# Patient Record
Sex: MALE | Race: BLACK OR AFRICAN AMERICAN | NOT HISPANIC OR LATINO | URBAN - METROPOLITAN AREA
[De-identification: names, ages, dates, MRNs, and addresses within clinical notes are randomized per-mention and may not be internally consistent; named-entity substitution may affect disease eponyms.]

---

## 2020-04-03 ENCOUNTER — INPATIENT (INPATIENT)
Facility: HOSPITAL | Age: 85
LOS: 10 days | Discharge: NURSING, REHAB OR LONG-TERM CARE | DRG: 975 | End: 2020-04-14
Attending: INTERNAL MEDICINE | Admitting: INTERNAL MEDICINE
Payer: MEDICAID

## 2020-04-03 VITALS
HEIGHT: 69 IN | HEART RATE: 79 BPM | RESPIRATION RATE: 28 BRPM | WEIGHT: 160.06 LBS | OXYGEN SATURATION: 93 % | SYSTOLIC BLOOD PRESSURE: 84 MMHG | DIASTOLIC BLOOD PRESSURE: 55 MMHG

## 2020-04-03 DIAGNOSIS — R09.02 HYPOXEMIA: ICD-10-CM

## 2020-04-03 DIAGNOSIS — E87.0 HYPEROSMOLALITY AND HYPERNATREMIA: ICD-10-CM

## 2020-04-03 DIAGNOSIS — E86.0 DEHYDRATION: ICD-10-CM

## 2020-04-03 DIAGNOSIS — Z29.9 ENCOUNTER FOR PROPHYLACTIC MEASURES, UNSPECIFIED: ICD-10-CM

## 2020-04-03 DIAGNOSIS — B20 HUMAN IMMUNODEFICIENCY VIRUS [HIV] DISEASE: ICD-10-CM

## 2020-04-03 DIAGNOSIS — I10 ESSENTIAL (PRIMARY) HYPERTENSION: ICD-10-CM

## 2020-04-03 DIAGNOSIS — J96.01 ACUTE RESPIRATORY FAILURE WITH HYPOXIA: ICD-10-CM

## 2020-04-03 DIAGNOSIS — N17.9 ACUTE KIDNEY FAILURE, UNSPECIFIED: ICD-10-CM

## 2020-04-03 LAB
ALBUMIN SERPL ELPH-MCNC: 3.4 G/DL — LOW (ref 3.5–5)
ALP SERPL-CCNC: 88 U/L — SIGNIFICANT CHANGE UP (ref 40–120)
ALT FLD-CCNC: 101 U/L DA — HIGH (ref 10–60)
ANION GAP SERPL CALC-SCNC: 18 MMOL/L — HIGH (ref 5–17)
AST SERPL-CCNC: 211 U/L — HIGH (ref 10–40)
BASOPHILS # BLD AUTO: 0.03 K/UL — SIGNIFICANT CHANGE UP (ref 0–0.2)
BASOPHILS NFR BLD AUTO: 0.2 % — SIGNIFICANT CHANGE UP (ref 0–2)
BILIRUB SERPL-MCNC: 2.2 MG/DL — HIGH (ref 0.2–1.2)
BUN SERPL-MCNC: 107 MG/DL — HIGH (ref 7–18)
CALCIUM SERPL-MCNC: 9.8 MG/DL — SIGNIFICANT CHANGE UP (ref 8.4–10.5)
CHLORIDE SERPL-SCNC: 116 MMOL/L — HIGH (ref 96–108)
CO2 SERPL-SCNC: 21 MMOL/L — LOW (ref 22–31)
CREAT SERPL-MCNC: 5.66 MG/DL — HIGH (ref 0.5–1.3)
D DIMER BLD IA.RAPID-MCNC: 1303 NG/ML DDU — HIGH
EOSINOPHIL # BLD AUTO: 0.04 K/UL — SIGNIFICANT CHANGE UP (ref 0–0.5)
EOSINOPHIL NFR BLD AUTO: 0.3 % — SIGNIFICANT CHANGE UP (ref 0–6)
ERYTHROCYTE [SEDIMENTATION RATE] IN BLOOD: 35 MM/HR — HIGH (ref 0–20)
GLUCOSE SERPL-MCNC: 148 MG/DL — HIGH (ref 70–99)
HCT VFR BLD CALC: 57.8 % — CRITICAL HIGH (ref 39–50)
HGB BLD-MCNC: 20.2 G/DL — CRITICAL HIGH (ref 13–17)
IMM GRANULOCYTES NFR BLD AUTO: 0.5 % — SIGNIFICANT CHANGE UP (ref 0–1.5)
LDH SERPL L TO P-CCNC: 657 U/L — HIGH (ref 120–225)
LYMPHOCYTES # BLD AUTO: 1.33 K/UL — SIGNIFICANT CHANGE UP (ref 1–3.3)
LYMPHOCYTES # BLD AUTO: 8.5 % — LOW (ref 13–44)
MCHC RBC-ENTMCNC: 29.7 PG — SIGNIFICANT CHANGE UP (ref 27–34)
MCHC RBC-ENTMCNC: 34.9 GM/DL — SIGNIFICANT CHANGE UP (ref 32–36)
MCV RBC AUTO: 85 FL — SIGNIFICANT CHANGE UP (ref 80–100)
MONOCYTES # BLD AUTO: 0.55 K/UL — SIGNIFICANT CHANGE UP (ref 0–0.9)
MONOCYTES NFR BLD AUTO: 3.5 % — SIGNIFICANT CHANGE UP (ref 2–14)
NEUTROPHILS # BLD AUTO: 13.58 K/UL — HIGH (ref 1.8–7.4)
NEUTROPHILS NFR BLD AUTO: 87 % — HIGH (ref 43–77)
NRBC # BLD: 0 /100 WBCS — SIGNIFICANT CHANGE UP (ref 0–0)
PLATELET # BLD AUTO: 144 K/UL — LOW (ref 150–400)
POTASSIUM SERPL-MCNC: 3.3 MMOL/L — LOW (ref 3.5–5.3)
POTASSIUM SERPL-SCNC: 3.3 MMOL/L — LOW (ref 3.5–5.3)
PROT SERPL-MCNC: 9.7 G/DL — HIGH (ref 6–8.3)
RBC # BLD: 6.8 M/UL — HIGH (ref 4.2–5.8)
RBC # FLD: 17.1 % — HIGH (ref 10.3–14.5)
SODIUM SERPL-SCNC: 155 MMOL/L — HIGH (ref 135–145)
WBC # BLD: 15.61 K/UL — HIGH (ref 3.8–10.5)
WBC # FLD AUTO: 15.61 K/UL — HIGH (ref 3.8–10.5)

## 2020-04-03 PROCEDURE — 71045 X-RAY EXAM CHEST 1 VIEW: CPT | Mod: 26

## 2020-04-03 PROCEDURE — 99284 EMERGENCY DEPT VISIT MOD MDM: CPT

## 2020-04-03 RX ORDER — BUPROPION HYDROCHLORIDE 150 MG/1
150 TABLET, EXTENDED RELEASE ORAL DAILY
Refills: 0 | Status: DISCONTINUED | OUTPATIENT
Start: 2020-04-03 | End: 2020-04-10

## 2020-04-03 RX ORDER — CEFTRIAXONE 500 MG/1
1000 INJECTION, POWDER, FOR SOLUTION INTRAMUSCULAR; INTRAVENOUS EVERY 24 HOURS
Refills: 0 | Status: COMPLETED | OUTPATIENT
Start: 2020-04-04 | End: 2020-04-08

## 2020-04-03 RX ORDER — CEFTRIAXONE 500 MG/1
INJECTION, POWDER, FOR SOLUTION INTRAMUSCULAR; INTRAVENOUS
Refills: 0 | Status: COMPLETED | OUTPATIENT
Start: 2020-04-03 | End: 2020-04-09

## 2020-04-03 RX ORDER — SODIUM CHLORIDE 9 MG/ML
1000 INJECTION INTRAMUSCULAR; INTRAVENOUS; SUBCUTANEOUS
Refills: 0 | Status: DISCONTINUED | OUTPATIENT
Start: 2020-04-03 | End: 2020-04-03

## 2020-04-03 RX ORDER — SODIUM CHLORIDE 9 MG/ML
1000 INJECTION INTRAMUSCULAR; INTRAVENOUS; SUBCUTANEOUS
Refills: 0 | Status: DISCONTINUED | OUTPATIENT
Start: 2020-04-03 | End: 2020-04-04

## 2020-04-03 RX ORDER — ASPIRIN/CALCIUM CARB/MAGNESIUM 324 MG
81 TABLET ORAL DAILY
Refills: 0 | Status: DISCONTINUED | OUTPATIENT
Start: 2020-04-03 | End: 2020-04-14

## 2020-04-03 RX ORDER — HEPARIN SODIUM 5000 [USP'U]/ML
5000 INJECTION INTRAVENOUS; SUBCUTANEOUS EVERY 12 HOURS
Refills: 0 | Status: DISCONTINUED | OUTPATIENT
Start: 2020-04-03 | End: 2020-04-14

## 2020-04-03 RX ORDER — ATORVASTATIN CALCIUM 80 MG/1
40 TABLET, FILM COATED ORAL AT BEDTIME
Refills: 0 | Status: DISCONTINUED | OUTPATIENT
Start: 2020-04-03 | End: 2020-04-10

## 2020-04-03 RX ORDER — SODIUM CHLORIDE 9 MG/ML
1000 INJECTION INTRAMUSCULAR; INTRAVENOUS; SUBCUTANEOUS ONCE
Refills: 0 | Status: COMPLETED | OUTPATIENT
Start: 2020-04-03 | End: 2020-04-03

## 2020-04-03 RX ORDER — AZITHROMYCIN 500 MG/1
500 TABLET, FILM COATED ORAL EVERY 24 HOURS
Refills: 0 | Status: DISCONTINUED | OUTPATIENT
Start: 2020-04-04 | End: 2020-04-10

## 2020-04-03 RX ORDER — ACETAMINOPHEN 500 MG
650 TABLET ORAL EVERY 6 HOURS
Refills: 0 | Status: DISCONTINUED | OUTPATIENT
Start: 2020-04-03 | End: 2020-04-10

## 2020-04-03 RX ORDER — AZITHROMYCIN 500 MG/1
TABLET, FILM COATED ORAL
Refills: 0 | Status: DISCONTINUED | OUTPATIENT
Start: 2020-04-03 | End: 2020-04-10

## 2020-04-03 RX ORDER — DOXAZOSIN MESYLATE 4 MG
2 TABLET ORAL AT BEDTIME
Refills: 0 | Status: DISCONTINUED | OUTPATIENT
Start: 2020-04-03 | End: 2020-04-10

## 2020-04-03 RX ORDER — AZITHROMYCIN 500 MG/1
500 TABLET, FILM COATED ORAL ONCE
Refills: 0 | Status: COMPLETED | OUTPATIENT
Start: 2020-04-03 | End: 2020-04-03

## 2020-04-03 RX ORDER — CLOPIDOGREL BISULFATE 75 MG/1
75 TABLET, FILM COATED ORAL DAILY
Refills: 0 | Status: DISCONTINUED | OUTPATIENT
Start: 2020-04-03 | End: 2020-04-14

## 2020-04-03 RX ORDER — CEFTRIAXONE 500 MG/1
1000 INJECTION, POWDER, FOR SOLUTION INTRAMUSCULAR; INTRAVENOUS ONCE
Refills: 0 | Status: COMPLETED | OUTPATIENT
Start: 2020-04-03 | End: 2020-04-03

## 2020-04-03 RX ADMIN — SODIUM CHLORIDE 1000 MILLILITER(S): 9 INJECTION INTRAMUSCULAR; INTRAVENOUS; SUBCUTANEOUS at 15:08

## 2020-04-03 RX ADMIN — SODIUM CHLORIDE 100 MILLILITER(S): 9 INJECTION INTRAMUSCULAR; INTRAVENOUS; SUBCUTANEOUS at 22:15

## 2020-04-03 RX ADMIN — CEFTRIAXONE 100 MILLIGRAM(S): 500 INJECTION, POWDER, FOR SOLUTION INTRAMUSCULAR; INTRAVENOUS at 20:03

## 2020-04-03 NOTE — ED PROVIDER NOTE - OBJECTIVE STATEMENT
86 y/o male with PMHx of HIV, CVA, HTN, HLD, dementia, and prostate CA sent from [place] for shortness of breath and hypoxia.  As per transfer papers, Sat 40% on room air.  In ED, Sat 93% with NRB at 15L.  Pt non-verbal and not able to give additional info.  History obtained from transfer papers and PMD - Dr. Nguyen.

## 2020-04-03 NOTE — ED PROVIDER NOTE - PROGRESS NOTE DETAILS
Emergency contact Myah Richmond contacted.  DNR/DNI state discussed however Ms. Richmond would like to discussed with family before making any decisions.   Creat of 5 noted, normal Potassium  pt to be admitted.

## 2020-04-03 NOTE — ED PROVIDER NOTE - CLINICAL SUMMARY MEDICAL DECISION MAKING FREE TEXT BOX
86 y/o male with history of dementia, CVA, HIV, prostate CA sent from nursing home for shortness of breath and hypoxia to ?40%.  Sat 93% on NRB.  likely COVID,  will check labs, CXR, COVID test and admit as patient is in need for supplemental oxygen.

## 2020-04-03 NOTE — H&P ADULT - PROBLEM SELECTOR PLAN 1
p/w Shortness of breath, cough   - Respiratory status- Not in distress, S02 on NC on my evaluation  - T-   - WBC: WNL, lymphopenia, transaminitis  - Flu: neg   - CXR --   - Ed course; Rocephin and Azithro, IV NS  lt.   - Will c/w Rocephin and Azithro to cover for CAP.   - Tylenol, Albuterol Inhaler, Robitussin PRN  - plaquenil, Vit. C, thiamine  - Will rule out covid 19 with contact and airborne isolation precaution   - LDH, Procalcitonin, ferritin   - FU COVID, RVP p/w hypoxia   - Respiratory status- Not in distress, S02 94% on NRB on my evaluation  - T- afebrile  - WBC: 15.61, transaminitis  - CXR -- No evidence of active chest disease. (likely due to dehydration)  - Ed course; IV NS 1 lt.   - Will c/w Rocephin and Azithro to cover for CAP.   - Tylenol, Robitussin PRN  - Vit. C, thiamine  - Will rule out covid 19 with contact and airborne isolation precaution   - LDH, Procalcitonin, ferritin   - FU COVID

## 2020-04-03 NOTE — H&P ADULT - NSHPPHYSICALEXAM_GEN_ALL_CORE
Vital Signs Last 24 Hrs  T(C): 36.4 (03 Apr 2020 15:19), Max: 36.6 (03 Apr 2020 13:46)  T(F): 97.5 (03 Apr 2020 15:19), Max: 97.9 (03 Apr 2020 13:46)  HR: 98 (03 Apr 2020 15:19) (79 - 110)  BP: 122/75 (03 Apr 2020 15:19) (84/55 - 122/75)  BP(mean): --  RR: 24 (03 Apr 2020 15:19) (24 - 28)  SpO2: 94% (03 Apr 2020 15:19) (93% - 94%)    PHYSICAL EXAM:  GENERAL: NAD, dry  HEENT: Normocephalic;  conjunctivae and sclerae clear;  NECK : supple  CHEST/LUNG: Clear to auscultation bilaterally with good air entry   HEART: S1 S2  regular; no murmurs, gallops or rubs  ABDOMEN: Soft, Nontender, Nondistended; Bowel sounds present  EXTREMITIES: no cyanosis; no edema; no calf tenderness  SKIN: warm and dry; no rash  NERVOUS SYSTEM:  Awake and alert; AAO XO ; no new deficits

## 2020-04-03 NOTE — CHART NOTE - NSCHARTNOTEFT_GEN_A_CORE
Notified by nurse that pt. has bleeding from nichols. Attempted to call surgery multiple times, unable to reach. Will keep nichols for now to prevent further injury and attempt to call surgery again.

## 2020-04-03 NOTE — H&P ADULT - NSICDXPASTMEDICALHX_GEN_ALL_CORE_FT
PAST MEDICAL HISTORY:  Cerebrovascular accident (CVA)     Dementia     History of TIAs     HIV disease     HTN (hypertension)     Prostate CA

## 2020-04-03 NOTE — ED ADULT NURSE NOTE - NSIMPLEMENTINTERV_GEN_ALL_ED
Implemented All Fall with Harm Risk Interventions:  [place] to call system. Call bell, personal items and telephone within reach. Instruct patient to call for assistance. Room bathroom lighting operational. Non-slip footwear when patient is off stretcher. Physically safe environment: no spills, clutter or unnecessary equipment. Stretcher in lowest position, wheels locked, appropriate side rails in place. Provide visual cue, wrist band, yellow gown, etc. Monitor gait and stability. Monitor for mental status changes and reorient to person, place, and time. Review medications for side effects contributing to fall risk. Reinforce activity limits and safety measures with patient and family. Provide visual clues: red socks.

## 2020-04-03 NOTE — H&P ADULT - HISTORY OF PRESENT ILLNESS
85M with PMHx of HIV, CVA, HTN, HLD, dementia, and prostate CA sent from [place] for shortness of breath and hypoxia. As per transfer papers, Sat 40% on room air.  In ED, Sat 93% with NRB at 15L. Pt non-verbal and not able to give additional info.  History obtained from transfer papers and PMD - Dr. Nguyen.

## 2020-04-03 NOTE — H&P ADULT - ASSESSMENT
85M with PMHx of HIV, CVA, HTN, HLD, dementia, and prostate CA sent from [place] for shortness of breath and hypoxia. As per transfer papers, Sat 40% on room air.  In ED, Sat 93% with NRB at 15L. Pt non-verbal and not able to give additional info.  History obtained from transfer papers and PMD - Dr. Nguyen. Unable to reach emergency .

## 2020-04-04 LAB
ALBUMIN SERPL ELPH-MCNC: 3.1 G/DL — LOW (ref 3.5–5)
ALBUMIN SERPL ELPH-MCNC: SIGNIFICANT CHANGE UP G/DL (ref 3.5–5)
ALP SERPL-CCNC: 84 U/L — SIGNIFICANT CHANGE UP (ref 40–120)
ALP SERPL-CCNC: 88 U/L — SIGNIFICANT CHANGE UP (ref 40–120)
ALT FLD-CCNC: 95 U/L DA — HIGH (ref 10–60)
ALT FLD-CCNC: 97 U/L DA — HIGH (ref 10–60)
ANION GAP SERPL CALC-SCNC: 10 MMOL/L — SIGNIFICANT CHANGE UP (ref 5–17)
ANION GAP SERPL CALC-SCNC: SIGNIFICANT CHANGE UP MMOL/L (ref 5–17)
AST SERPL-CCNC: 196 U/L — HIGH (ref 10–40)
AST SERPL-CCNC: SIGNIFICANT CHANGE UP U/L (ref 10–40)
BASOPHILS # BLD AUTO: 0.01 K/UL — SIGNIFICANT CHANGE UP (ref 0–0.2)
BASOPHILS NFR BLD AUTO: 0.1 % — SIGNIFICANT CHANGE UP (ref 0–2)
BILIRUB SERPL-MCNC: 2 MG/DL — HIGH (ref 0.2–1.2)
BILIRUB SERPL-MCNC: 2.3 MG/DL — HIGH (ref 0.2–1.2)
BUN SERPL-MCNC: 108 MG/DL — HIGH (ref 7–18)
BUN SERPL-MCNC: SIGNIFICANT CHANGE UP MG/DL (ref 7–18)
CALCIUM SERPL-MCNC: 9.3 MG/DL — SIGNIFICANT CHANGE UP (ref 8.4–10.5)
CALCIUM SERPL-MCNC: SIGNIFICANT CHANGE UP MG/DL (ref 8.4–10.5)
CHLORIDE SERPL-SCNC: 118 MMOL/L — HIGH (ref 96–108)
CHLORIDE SERPL-SCNC: SIGNIFICANT CHANGE UP MMOL/L (ref 96–108)
CHOLEST SERPL-MCNC: 132 MG/DL — SIGNIFICANT CHANGE UP (ref 10–199)
CO2 SERPL-SCNC: 24 MMOL/L — SIGNIFICANT CHANGE UP (ref 22–31)
CO2 SERPL-SCNC: SIGNIFICANT CHANGE UP MMOL/L (ref 22–31)
CREAT SERPL-MCNC: 4.52 MG/DL — HIGH (ref 0.5–1.3)
CREAT SERPL-MCNC: 5.27 MG/DL — HIGH (ref 0.5–1.3)
EOSINOPHIL # BLD AUTO: 0 K/UL — SIGNIFICANT CHANGE UP (ref 0–0.5)
EOSINOPHIL NFR BLD AUTO: 0 % — SIGNIFICANT CHANGE UP (ref 0–6)
FERRITIN SERPL-MCNC: 1441 NG/ML — HIGH (ref 30–400)
GLUCOSE BLDC GLUCOMTR-MCNC: 148 MG/DL — HIGH (ref 70–99)
GLUCOSE BLDC GLUCOMTR-MCNC: 61 MG/DL — LOW (ref 70–99)
GLUCOSE SERPL-MCNC: 103 MG/DL — HIGH (ref 70–99)
GLUCOSE SERPL-MCNC: SIGNIFICANT CHANGE UP MG/DL (ref 70–99)
HBA1C BLD-MCNC: 6.2 % — HIGH (ref 4–5.6)
HCT VFR BLD CALC: 56 % — HIGH (ref 39–50)
HDLC SERPL-MCNC: 43 MG/DL — SIGNIFICANT CHANGE UP
HGB BLD-MCNC: 19.3 G/DL — CRITICAL HIGH (ref 13–17)
IMM GRANULOCYTES NFR BLD AUTO: 0.5 % — SIGNIFICANT CHANGE UP (ref 0–1.5)
LDH SERPL L TO P-CCNC: 666 U/L — HIGH (ref 120–225)
LIPID PNL WITH DIRECT LDL SERPL: 61 MG/DL — SIGNIFICANT CHANGE UP
LYMPHOCYTES # BLD AUTO: 1.46 K/UL — SIGNIFICANT CHANGE UP (ref 1–3.3)
LYMPHOCYTES # BLD AUTO: 11 % — LOW (ref 13–44)
MAGNESIUM SERPL-MCNC: 3.2 MG/DL — HIGH (ref 1.6–2.6)
MCHC RBC-ENTMCNC: 29.1 PG — SIGNIFICANT CHANGE UP (ref 27–34)
MCHC RBC-ENTMCNC: 34.5 GM/DL — SIGNIFICANT CHANGE UP (ref 32–36)
MCV RBC AUTO: 84.3 FL — SIGNIFICANT CHANGE UP (ref 80–100)
MONOCYTES # BLD AUTO: 0.4 K/UL — SIGNIFICANT CHANGE UP (ref 0–0.9)
MONOCYTES NFR BLD AUTO: 3 % — SIGNIFICANT CHANGE UP (ref 2–14)
NEUTROPHILS # BLD AUTO: 11.36 K/UL — HIGH (ref 1.8–7.4)
NEUTROPHILS NFR BLD AUTO: 85.4 % — HIGH (ref 43–77)
NRBC # BLD: 0 /100 WBCS — SIGNIFICANT CHANGE UP (ref 0–0)
PHOSPHATE SERPL-MCNC: 4.3 MG/DL — SIGNIFICANT CHANGE UP (ref 2.5–4.5)
PLATELET # BLD AUTO: 115 K/UL — LOW (ref 150–400)
POTASSIUM SERPL-MCNC: 3.4 MMOL/L — LOW (ref 3.5–5.3)
POTASSIUM SERPL-MCNC: SIGNIFICANT CHANGE UP MMOL/L (ref 3.5–5.3)
POTASSIUM SERPL-SCNC: 3.4 MMOL/L — LOW (ref 3.5–5.3)
POTASSIUM SERPL-SCNC: SIGNIFICANT CHANGE UP MMOL/L (ref 3.5–5.3)
PROT SERPL-MCNC: 8.6 G/DL — HIGH (ref 6–8.3)
PROT SERPL-MCNC: 8.8 G/DL — HIGH (ref 6–8.3)
RBC # BLD: 6.64 M/UL — HIGH (ref 4.2–5.8)
RBC # FLD: 16.5 % — HIGH (ref 10.3–14.5)
SARS-COV-2 RNA SPEC QL NAA+PROBE: DETECTED
SODIUM SERPL-SCNC: 152 MMOL/L — HIGH (ref 135–145)
SODIUM SERPL-SCNC: SIGNIFICANT CHANGE UP MMOL/L (ref 135–145)
TOTAL CHOLESTEROL/HDL RATIO MEASUREMENT: 3.1 RATIO — LOW (ref 3.4–9.6)
TRIGL SERPL-MCNC: 139 MG/DL — SIGNIFICANT CHANGE UP (ref 10–149)
TSH SERPL-MCNC: 0.54 UU/ML — SIGNIFICANT CHANGE UP (ref 0.34–4.82)
VIT B12 SERPL-MCNC: >2000 PG/ML — HIGH (ref 232–1245)
WBC # BLD: 13.3 K/UL — HIGH (ref 3.8–10.5)
WBC # FLD AUTO: 13.3 K/UL — HIGH (ref 3.8–10.5)

## 2020-04-04 RX ORDER — DEXTROSE 50 % IN WATER 50 %
25 SYRINGE (ML) INTRAVENOUS ONCE
Refills: 0 | Status: DISCONTINUED | OUTPATIENT
Start: 2020-04-04 | End: 2020-04-04

## 2020-04-04 RX ORDER — DOLUTEGRAVIR SODIUM 25 MG/1
50 TABLET, FILM COATED ORAL DAILY
Refills: 0 | Status: DISCONTINUED | OUTPATIENT
Start: 2020-04-04 | End: 2020-04-14

## 2020-04-04 RX ORDER — DEXTROSE 50 % IN WATER 50 %
50 SYRINGE (ML) INTRAVENOUS ONCE
Refills: 0 | Status: COMPLETED | OUTPATIENT
Start: 2020-04-04 | End: 2020-04-04

## 2020-04-04 RX ORDER — SODIUM CHLORIDE 9 MG/ML
1000 INJECTION, SOLUTION INTRAVENOUS
Refills: 0 | Status: DISCONTINUED | OUTPATIENT
Start: 2020-04-04 | End: 2020-04-05

## 2020-04-04 RX ADMIN — CEFTRIAXONE 100 MILLIGRAM(S): 500 INJECTION, POWDER, FOR SOLUTION INTRAMUSCULAR; INTRAVENOUS at 17:44

## 2020-04-04 RX ADMIN — Medication 50 MILLILITER(S): at 01:15

## 2020-04-04 RX ADMIN — Medication 2 MILLIGRAM(S): at 21:55

## 2020-04-04 RX ADMIN — SODIUM CHLORIDE 100 MILLILITER(S): 9 INJECTION, SOLUTION INTRAVENOUS at 08:00

## 2020-04-04 RX ADMIN — HEPARIN SODIUM 5000 UNIT(S): 5000 INJECTION INTRAVENOUS; SUBCUTANEOUS at 06:21

## 2020-04-04 RX ADMIN — AZITHROMYCIN 255 MILLIGRAM(S): 500 TABLET, FILM COATED ORAL at 17:44

## 2020-04-04 RX ADMIN — SODIUM CHLORIDE 100 MILLILITER(S): 9 INJECTION, SOLUTION INTRAVENOUS at 01:28

## 2020-04-04 RX ADMIN — HEPARIN SODIUM 5000 UNIT(S): 5000 INJECTION INTRAVENOUS; SUBCUTANEOUS at 19:28

## 2020-04-04 RX ADMIN — ATORVASTATIN CALCIUM 40 MILLIGRAM(S): 80 TABLET, FILM COATED ORAL at 21:55

## 2020-04-04 NOTE — PROGRESS NOTE ADULT - SUBJECTIVE AND OBJECTIVE BOX
Patient is a 85y old  Male who presents with a chief complaint of   PATIENT IS SEEN AND EXAMINED IN MEDICAL FLOOR.  NGT [    ]    VANESSA [   ]      GT [   ]    ALLERGIES:  No Known Allergies      Daily     Daily     VITALS:    Vital Signs Last 24 Hrs  T(C): 36.9 (04 Apr 2020 17:05), Max: 37.2 (04 Apr 2020 06:38)  T(F): 98.5 (04 Apr 2020 17:05), Max: 99 (04 Apr 2020 06:38)  HR: 109 (04 Apr 2020 17:05) (69 - 109)  BP: 82/66 (04 Apr 2020 17:05) (82/66 - 132/85)  BP(mean): --  RR: 19 (04 Apr 2020 17:05) (18 - 24)  SpO2: 99% (04 Apr 2020 17:05) (51% - 99%)    LABS:    CBC Full  -  ( 04 Apr 2020 08:59 )  WBC Count : 13.30 K/uL  RBC Count : 6.64 M/uL  Hemoglobin : 19.3 g/dL  Hematocrit : 56.0 %  Platelet Count - Automated : 115 K/uL  Mean Cell Volume : 84.3 fl  Mean Cell Hemoglobin : 29.1 pg  Mean Cell Hemoglobin Concentration : 34.5 gm/dL  Auto Neutrophil # : 11.36 K/uL  Auto Lymphocyte # : 1.46 K/uL  Auto Monocyte # : 0.40 K/uL  Auto Eosinophil # : 0.00 K/uL  Auto Basophil # : 0.01 K/uL  Auto Neutrophil % : 85.4 %  Auto Lymphocyte % : 11.0 %  Auto Monocyte % : 3.0 %  Auto Eosinophil % : 0.0 %  Auto Basophil % : 0.1 %      04-04    152<H>  |  118<H>  |  108<H>  ----------------------------<  103<H>  3.4<L>   |  24  |  4.52<H>    Ca    9.3      04 Apr 2020 08:59  Phos  4.3     04-04  Mg     3.2     04-04    TPro  8.6<H>  /  Alb  3.1<L>  /  TBili  2.0<H>  /  DBili  x   /  AST  196<H>  /  ALT  97<H>  /  AlkPhos  88  04-04    CAPILLARY BLOOD GLUCOSE      POCT Blood Glucose.: 148 mg/dL (04 Apr 2020 06:13)  POCT Blood Glucose.: 61 mg/dL (04 Apr 2020 01:09)        LIVER FUNCTIONS - ( 04 Apr 2020 08:59 )  Alb: 3.1 g/dL / Pro: 8.6 g/dL / ALK PHOS: 88 U/L / ALT: 97 U/L DA / AST: 196 U/L / GGT: x           Creatinine Trend: 4.52<--, see note<--, 5.66<--  I&O's Summary    03 Apr 2020 07:01  -  04 Apr 2020 07:00  --------------------------------------------------------  IN: 0 mL / OUT: 2 mL / NET: -2 mL                MEDICATIONS:    MEDICATIONS  (STANDING):  abacavir 600 mG/lamivudine 300 mG 1 Tablet(s) Oral daily  aspirin  chewable 81 milliGRAM(s) Oral daily  atorvastatin 40 milliGRAM(s) Oral at bedtime  azithromycin  IVPB      azithromycin  IVPB 500 milliGRAM(s) IV Intermittent every 24 hours  buPROPion XL . 150 milliGRAM(s) Oral daily  cefTRIAXone   IVPB 1000 milliGRAM(s) IV Intermittent every 24 hours  cefTRIAXone   IVPB      clopidogrel Tablet 75 milliGRAM(s) Oral daily  dextrose 5%. 1000 milliLiter(s) (100 mL/Hr) IV Continuous <Continuous>  dolutegravir 50 milliGRAM(s) Oral daily  doxazosin 2 milliGRAM(s) Oral at bedtime  heparin  Injectable 5000 Unit(s) SubCutaneous every 12 hours      MEDICATIONS  (PRN):  acetaminophen  Suppository .. 650 milliGRAM(s) Rectal every 6 hours PRN Temp greater or equal to 38C (100.4F)  guaiFENesin   Syrup  (Sugar-Free) 100 milliGRAM(s) Oral every 6 hours PRN Cough      REVIEW OF SYSTEMS:                           ALL ROS DONE [ X   ]    CONSTITUTIONAL:  LETHARGIC [   ], FEVER [   ], UNRESPONSIVE [   ]  CVS:  CP  [   ], SOB, [   ], PALPITATIONS [   ], DIZZYNESS [   ]  RS: COUGH [   ], SPUTUM [   ]  GI: ABDOMINAL PAIN [   ], NAUSEA [   ], VOMITINGS [   ], DIARRHEA [   ], CONSTIPATION [   ]  :  DYSURIA [   ], NOCTURIA [   ], INCREASED FREQUENCY [   ], DRIBLING [   ],  SKELETAL: PAINFUL JOINTS [   ], SWOLLEN JOINTS [   ], NECK ACHE [   ], LOW BACK ACHE [   ],  SKIN : ULCERS [   ], RASH [   ], ITCHING [   ]  CNS: HEAD ACHE [   ], DOUBLE VISION [   ], BLURRED VISION [   ], AMS / CONFUSION [   ], SEIZURES [   ], WEAKNESS [   ],TINGLING / NUMBNESS [   ]    PHYSICAL EXAMINATION:  GENERAL APPEARANCE: NO DISTRESS  HEENT:  NO PALLOR, NO  JVD,  NO   NODES, NECK SUPPLE  CVS: S1 +, S2 +,   RS: AEEB,  OCCASIONAL  RALES +,   NO RONCHI  ABD: SOFT, NT, NO, BS +  EXT: NO PE  SKIN: WARM,   SKELETAL:  ROM ACCEPTABLE  CNS:  AAO X  0  ,   UNRESPONSIVE    RADIOLOGY :    < from: Xray Chest 1 View-PORTABLE IMMEDIATE (04.03.20 @ 15:11) >    FINDINGS:   The lungs  are clear.  No pleural abnormality is seen.    Heart size within normal limits allowing for magnification effect of AP projection. Aortic arch calcified.  Visualized osseous structures are intact.        IMPRESSION:   No evidence of active chest disease.    < end of copied text >      COVID-19 PCR . (04.03.20 @ 23:59)    COVID-19 PCR: Detected: This test has been validated by [employer] to be accurate;  though it has not been FDA cleared/approved by the usual pathway.  As with all laboratory tests, results should be correlated with clinical  findings.  https://www.fda.gov/media/253469/download  https://www.fda.gov/media/216421/download        ASSESSMENT :     Hypoxemia  Yes  Dementia  History of TIAs  Prostate CA  HTN (hypertension)  Cerebrovascular accident (CVA)  HIV disease      PLAN:    HPI:  85M with PMHx of HIV, CVA, HTN, HLD, dementia, and prostate CA sent from [place] for shortness of breath and hypoxia. As per transfer papers, Sat 40% on room air.  In ED, Sat 93% with NRB at 15L. Pt non-verbal and not able to give additional info.  History obtained from transfer papers and PMD - Dr. Nguyen. (03 Apr 2020 15:32)    - HYPOXIC RESPIRATORY FAILURE DUE TO COVID 19 PNEUMONIA , AND SEPSIS  - ARF, CKD , HYPERNATREMIA   - AMS DUE TO METABOLIC ENCEPHALOPATHY  - PROGNOSIS IS POOR. PALLIATIVE CARE IS IN PROGRESS, D/W FAMILY - DNR / DNI / COMFORT CARE   - GI AND DVT PROPHYLAXIS  - DR. NGUYEN

## 2020-04-04 NOTE — RAPID RESPONSE TEAM SUMMARY - NSSITUATIONBACKGROUNDRRT_GEN_ALL_CORE
RRT called for 85 M w/ PMH HIV, CVA, HTN, HLD, Dementia, and Prostate CA sent from [place] for shortness of breath and hypoxia placed on NRB - discussed patient's severity of illness w/ jammie Richmond, agreed for comfort measures, DNR, DNI, and she will address the patient's course with here sister (them 2 being the last reported living relatives)

## 2020-04-05 RX ORDER — SODIUM CHLORIDE 9 MG/ML
1000 INJECTION, SOLUTION INTRAVENOUS
Refills: 0 | Status: DISCONTINUED | OUTPATIENT
Start: 2020-04-05 | End: 2020-04-06

## 2020-04-05 RX ADMIN — AZITHROMYCIN 255 MILLIGRAM(S): 500 TABLET, FILM COATED ORAL at 17:22

## 2020-04-05 RX ADMIN — CEFTRIAXONE 100 MILLIGRAM(S): 500 INJECTION, POWDER, FOR SOLUTION INTRAMUSCULAR; INTRAVENOUS at 17:22

## 2020-04-05 RX ADMIN — SODIUM CHLORIDE 100 MILLILITER(S): 9 INJECTION, SOLUTION INTRAVENOUS at 17:16

## 2020-04-05 RX ADMIN — HEPARIN SODIUM 5000 UNIT(S): 5000 INJECTION INTRAVENOUS; SUBCUTANEOUS at 17:15

## 2020-04-05 RX ADMIN — Medication 2 MILLIGRAM(S): at 21:41

## 2020-04-05 RX ADMIN — ATORVASTATIN CALCIUM 40 MILLIGRAM(S): 80 TABLET, FILM COATED ORAL at 21:41

## 2020-04-05 RX ADMIN — HEPARIN SODIUM 5000 UNIT(S): 5000 INJECTION INTRAVENOUS; SUBCUTANEOUS at 06:16

## 2020-04-05 NOTE — PROGRESS NOTE ADULT - SUBJECTIVE AND OBJECTIVE BOX
Patient is a 85y old  Male who presents with a chief complaint of Acute Hypoxemia due to Viral Pneumonia (05 Apr 2020 16:15)    PATIENT IS SEEN AND EXAMINED IN MEDICAL FLOOR.    ALLERGIES:  No Known Allergies        VITALS:    Vital Signs Last 24 Hrs  T(C): 36.6 (05 Apr 2020 16:38), Max: 36.9 (05 Apr 2020 01:16)  T(F): 97.9 (05 Apr 2020 16:38), Max: 98.4 (05 Apr 2020 01:16)  HR: 89 (05 Apr 2020 16:38) (73 - 112)  BP: 120/86 (05 Apr 2020 16:38) (111/80 - 132/59)  BP(mean): --  RR: 22 (05 Apr 2020 16:38) (22 - 22)  SpO2: 97% (05 Apr 2020 16:38) (95% - 98%)    LABS:    CBC Full  -  ( 04 Apr 2020 08:59 )  WBC Count : 13.30 K/uL  RBC Count : 6.64 M/uL  Hemoglobin : 19.3 g/dL  Hematocrit : 56.0 %  Platelet Count - Automated : 115 K/uL  Mean Cell Volume : 84.3 fl  Mean Cell Hemoglobin : 29.1 pg  Mean Cell Hemoglobin Concentration : 34.5 gm/dL  Auto Neutrophil # : 11.36 K/uL  Auto Lymphocyte # : 1.46 K/uL  Auto Monocyte # : 0.40 K/uL  Auto Eosinophil # : 0.00 K/uL  Auto Basophil # : 0.01 K/uL  Auto Neutrophil % : 85.4 %  Auto Lymphocyte % : 11.0 %  Auto Monocyte % : 3.0 %  Auto Eosinophil % : 0.0 %  Auto Basophil % : 0.1 %      04-04    152<H>  |  118<H>  |  108<H>  ----------------------------<  103<H>  3.4<L>   |  24  |  4.52<H>    Ca    9.3      04 Apr 2020 08:59  Phos  4.3     04-04  Mg     3.2     04-04    TPro  8.6<H>  /  Alb  3.1<L>  /  TBili  2.0<H>  /  DBili  x   /  AST  196<H>  /  ALT  97<H>  /  AlkPhos  88  04-04      LIVER FUNCTIONS - ( 04 Apr 2020 08:59 )  Alb: 3.1 g/dL / Pro: 8.6 g/dL / ALK PHOS: 88 U/L / ALT: 97 U/L DA / AST: 196 U/L / GGT: x           Creatinine Trend: 4.52<--, see note<--, 5.66<--  I&O's Summary      MEDICATIONS:    MEDICATIONS  (STANDING):  abacavir 600 mG/lamivudine 300 mG 1 Tablet(s) Oral daily  aspirin  chewable 81 milliGRAM(s) Oral daily  atorvastatin 40 milliGRAM(s) Oral at bedtime  azithromycin  IVPB      azithromycin  IVPB 500 milliGRAM(s) IV Intermittent every 24 hours  buPROPion XL . 150 milliGRAM(s) Oral daily  cefTRIAXone   IVPB 1000 milliGRAM(s) IV Intermittent every 24 hours  cefTRIAXone   IVPB      clopidogrel Tablet 75 milliGRAM(s) Oral daily  dextrose 5%. 1000 milliLiter(s) (100 mL/Hr) IV Continuous <Continuous>  dolutegravir 50 milliGRAM(s) Oral daily  doxazosin 2 milliGRAM(s) Oral at bedtime  heparin  Injectable 5000 Unit(s) SubCutaneous every 12 hours      MEDICATIONS  (PRN):  acetaminophen  Suppository .. 650 milliGRAM(s) Rectal every 6 hours PRN Temp greater or equal to 38C (100.4F)  guaiFENesin   Syrup  (Sugar-Free) 100 milliGRAM(s) Oral every 6 hours PRN Cough      REVIEW OF SYSTEMS:                           ALL ROS DONE [ X   ]    CONSTITUTIONAL:  LETHARGIC [   ], FEVER [   ], UNRESPONSIVE [   ]  CVS:  CP  [   ], SOB, [   ], PALPITATIONS [   ], DIZZYNESS [   ]  RS: COUGH [   ], SPUTUM [   ]  GI: ABDOMINAL PAIN [   ], NAUSEA [   ], VOMITINGS [   ], DIARRHEA [   ], CONSTIPATION [   ]  :  DYSURIA [   ], NOCTURIA [   ], INCREASED FREQUENCY [   ], DRIBLING [   ],  SKELETAL: PAINFUL JOINTS [   ], SWOLLEN JOINTS [   ], NECK ACHE [   ], LOW BACK ACHE [   ],  SKIN : ULCERS [   ], RASH [   ], ITCHING [   ]  CNS: HEAD ACHE [   ], DOUBLE VISION [   ], BLURRED VISION [   ], AMS / CONFUSION [   ], SEIZURES [   ], WEAKNESS [   ],TINGLING / NUMBNESS [   ]    PHYSICAL EXAMINATION:  GENERAL APPEARANCE: NO DISTRESS  HEENT:  NO PALLOR, NO  JVD,  NO   NODES, NECK SUPPLE  CVS: S1 +, S2 +,   RS: AEEB,  OCCASIONAL  RALES +,   NO RONCHI  ABD: SOFT, NT, NO, BS +  EXT: NO PE  SKIN: WARM,   SKELETAL:  ROM ACCEPTABLE  CNS:  AAO X  0  ,   UNRESPONSIVE    RADIOLOGY :    < from: Xray Chest 1 View-PORTABLE IMMEDIATE (04.03.20 @ 15:11) >    FINDINGS:   The lungs  are clear.  No pleural abnormality is seen.    Heart size within normal limits allowing for magnification effect of AP projection. Aortic arch calcified.  Visualized osseous structures are intact.        IMPRESSION:   No evidence of active chest disease.    < end of copied text >      COVID-19 PCR . (04.03.20 @ 23:59)    COVID-19 PCR: Detected: This test has been validated by [employer] to be accurate;  though it has not been FDA cleared/approved by the usual pathway.  As with all laboratory tests, results should be correlated with clinical  findings.  https://www.fda.gov/media/178397/download  https://www.fda.gov/media/295194/download        ASSESSMENT :     Hypoxemia  Yes  Dementia  History of TIAs  Prostate CA  HTN (hypertension)  Cerebrovascular accident (CVA)  HIV disease      PLAN:    HPI:  85M with PMHx of HIV, CVA, HTN, HLD, dementia, and prostate CA sent from [place] for shortness of breath and hypoxia. As per transfer papers, Sat 40% on room air.  In ED, Sat 93% with NRB at 15L. Pt non-verbal and not able to give additional info.  History obtained from transfer papers and PMD - Dr. Nguyen. (03 Apr 2020 15:32)    - HYPOXIC RESPIRATORY FAILURE DUE TO COVID 19 PNEUMONIA , AND SEPSIS  - ARF, CKD , HYPERNATREMIA   - AMS DUE TO METABOLIC ENCEPHALOPATHY, ADVANCED VASCULAR DEMENTIA   -  HIV - STABLE ON ANTIRETROVIRALS  - PROGNOSIS IS POOR. PALLIATIVE CARE IS IN PROGRESS, D/W FAMILY - DNR / DNI / COMFORT CARE   - GI AND DVT PROPHYLAXIS  - DR. NGUYEN

## 2020-04-05 NOTE — PROGRESS NOTE ADULT - ASSESSMENT
85M with PMHx of HIV, CVA, HTN, HLD, dementia, and prostate CA sent from [place] for shortness of breath and hypoxia. As per transfer papers, Sat 40% on room air.  In ED, Sat 93% with NRB at 15L. Pt non-verbal and not able to give additional info.  History obtained from transfer papers and PMD - Dr. Nguyen. Unable to reach emergency .

## 2020-04-05 NOTE — PROGRESS NOTE ADULT - PROBLEM SELECTOR PLAN 1
p/w hypoxia   - Respiratory status- Not in distress, S02 95% on Nasal Cannula @ 5L  - T- afebrile  - WBC: 13.30  - CXR -- No evidence of active chest disease.   - will d/w attending regarding discontinue Rocephin  and starting Plaquenil and Azithromycin regimen for Covid+ PNA  - Tylenol, Robitussin PRN  - Vit. C, thiamine  - Positive covid 19 with contact and airborne isolation precaution p/w hypoxia   - Respiratory status- Not in distress, S02 95% on NRB @ 15 Liters (patient did not tolerate Nasal Canula, desat 02 to 80s)  - T- afebrile  - WBC: 13.30  - CXR -- No evidence of active chest disease.   - will d/w attending regarding discontinue Rocephin  and starting Plaquenil and Azithromycin regimen for Covid+ PNA  - Tylenol, Robitussin PRN  - Vit. C, thiamine  - Positive covid 19 with contact and airborne isolation precaution

## 2020-04-05 NOTE — PROGRESS NOTE ADULT - SUBJECTIVE AND OBJECTIVE BOX
Patient is a 85y old  Male who presents with a chief complaint of HYPOXIA, SEPSIS (04 Apr 2020 17:41)      SUBJECTIVE / OVERNIGHT EVENTS:  Patient c/o SOB, dry cough.  Denies fever, chills, No cp, abdominal pain , N/V/D    MEDICATIONS  (STANDING):  abacavir 600 mG/lamivudine 300 mG 1 Tablet(s) Oral daily  aspirin  chewable 81 milliGRAM(s) Oral daily  atorvastatin 40 milliGRAM(s) Oral at bedtime  azithromycin  IVPB      azithromycin  IVPB 500 milliGRAM(s) IV Intermittent every 24 hours  buPROPion XL . 150 milliGRAM(s) Oral daily  cefTRIAXone   IVPB 1000 milliGRAM(s) IV Intermittent every 24 hours  cefTRIAXone   IVPB      clopidogrel Tablet 75 milliGRAM(s) Oral daily  dextrose 5%. 1000 milliLiter(s) (100 mL/Hr) IV Continuous <Continuous>  dolutegravir 50 milliGRAM(s) Oral daily  doxazosin 2 milliGRAM(s) Oral at bedtime  heparin  Injectable 5000 Unit(s) SubCutaneous every 12 hours    MEDICATIONS  (PRN):  acetaminophen  Suppository .. 650 milliGRAM(s) Rectal every 6 hours PRN Temp greater or equal to 38C (100.4F)  guaiFENesin   Syrup  (Sugar-Free) 100 milliGRAM(s) Oral every 6 hours PRN Cough      Vital Signs Last 24 Hrs  T(C): 36.4 (05 Apr 2020 08:49), Max: 36.9 (04 Apr 2020 17:05)  T(F): 97.5 (05 Apr 2020 08:49), Max: 98.5 (04 Apr 2020 17:05)  HR: 73 (05 Apr 2020 08:49) (73 - 112)  BP: 132/59 (05 Apr 2020 08:49) (82/66 - 132/59)  BP(mean): --  RR: 22 (05 Apr 2020 08:49) (19 - 22)  SpO2: 95% (05 Apr 2020 08:49) (95% - 99%)  CAPILLARY BLOOD GLUCOSE        I&O's Summary      PHYSICAL EXAM:  GENERAL: NAD, well-developed  EYES: EOMI, PERRLA, conjunctiva and sclera clear  CHEST/LUNG: Clear to auscultation bilaterally;  HEART: Regular rate and rhythm;   ABDOMEN: Soft, Nontender, Nondistended;   EXTREMITIES:  2+ Peripheral Pulses, No edema  NEUROLOGY: non-focal  SKIN: No rashes or lesions    LABS:                        19.3   13.30 )-----------( 115      ( 04 Apr 2020 08:59 )             56.0     04-04    152<H>  |  118<H>  |  108<H>  ----------------------------<  103<H>  3.4<L>   |  24  |  4.52<H>    Ca    9.3      04 Apr 2020 08:59  Phos  4.3     04-04  Mg     3.2     04-04    TPro  8.6<H>  /  Alb  3.1<L>  /  TBili  2.0<H>  /  DBili  x   /  AST  196<H>  /  ALT  97<H>  /  AlkPhos  88  04-04      < from: Xray Chest 1 View-PORTABLE IMMEDIATE (04.03.20 @ 15:11) >  IMPRESSION:   No evidence of active chest disease.    < end of copied text >  COVID-19 PCR . (04.03.20 @ 23:59)      COVID-19 PCR: Detected: This test has been validated by [employer] to be accurate;  though it has not been FDA cleared/approved by the usual pathway.  As with all laboratory tests, results should be correlated with clinical  findings.  https://www.fda.gov/media/603205/download  https://www.fda.gov/media/346779/download

## 2020-04-06 LAB
4/8 RATIO: 0.27 RATIO — LOW (ref 0.86–4.14)
ABS CD8: 508 /UL — SIGNIFICANT CHANGE UP (ref 90–775)
CD3 BLASTS SPEC-ACNC: 685 /UL — SIGNIFICANT CHANGE UP (ref 396–2024)
CD3 BLASTS SPEC-ACNC: 76 % — SIGNIFICANT CHANGE UP (ref 58–84)
CD4 %: 15 % — LOW (ref 30–56)
CD8 %: 57 % — HIGH (ref 11–43)
GLUCOSE BLDC GLUCOMTR-MCNC: 122 MG/DL — HIGH (ref 70–99)
HIV-1 VIRAL LOAD RESULT: SIGNIFICANT CHANGE UP
HIV1 RNA # SERPL NAA+PROBE: SIGNIFICANT CHANGE UP
HIV1 RNA SER-IMP: SIGNIFICANT CHANGE UP
HIV1 RNA SERPL NAA+PROBE-ACNC: SIGNIFICANT CHANGE UP
HIV1 RNA SERPL NAA+PROBE-LOG#: SIGNIFICANT CHANGE UP LG COP/ML
T-CELL CD4 SUBSET PNL BLD: 138 /UL — LOW (ref 325–1251)

## 2020-04-06 RX ORDER — SODIUM CHLORIDE 9 MG/ML
1000 INJECTION, SOLUTION INTRAVENOUS
Refills: 0 | Status: DISCONTINUED | OUTPATIENT
Start: 2020-04-06 | End: 2020-04-10

## 2020-04-06 RX ORDER — SODIUM CHLORIDE 9 MG/ML
1000 INJECTION, SOLUTION INTRAVENOUS
Refills: 0 | Status: DISCONTINUED | OUTPATIENT
Start: 2020-04-06 | End: 2020-04-06

## 2020-04-06 RX ADMIN — HEPARIN SODIUM 5000 UNIT(S): 5000 INJECTION INTRAVENOUS; SUBCUTANEOUS at 05:53

## 2020-04-06 RX ADMIN — CEFTRIAXONE 100 MILLIGRAM(S): 500 INJECTION, POWDER, FOR SOLUTION INTRAMUSCULAR; INTRAVENOUS at 17:37

## 2020-04-06 RX ADMIN — AZITHROMYCIN 255 MILLIGRAM(S): 500 TABLET, FILM COATED ORAL at 17:37

## 2020-04-06 RX ADMIN — HEPARIN SODIUM 5000 UNIT(S): 5000 INJECTION INTRAVENOUS; SUBCUTANEOUS at 17:30

## 2020-04-06 NOTE — PROGRESS NOTE ADULT - SUBJECTIVE AND OBJECTIVE BOX
Patient is a 85y old  Male who presents with a chief complaint of hypoxia (05 Apr 2020 18:12)      SUBJECTIVE / OVERNIGHT EVENTS:  Patient seen and evaluated on medical floor.  AOx0, nonverbal, responsive to tactile stimuli    MEDICATIONS  (STANDING):  abacavir 600 mG/lamivudine 300 mG 1 Tablet(s) Oral daily  aspirin  chewable 81 milliGRAM(s) Oral daily  atorvastatin 40 milliGRAM(s) Oral at bedtime  azithromycin  IVPB      azithromycin  IVPB 500 milliGRAM(s) IV Intermittent every 24 hours  buPROPion XL . 150 milliGRAM(s) Oral daily  cefTRIAXone   IVPB 1000 milliGRAM(s) IV Intermittent every 24 hours  cefTRIAXone   IVPB      clopidogrel Tablet 75 milliGRAM(s) Oral daily  dextrose 5%. 1000 milliLiter(s) (100 mL/Hr) IV Continuous <Continuous>  dolutegravir 50 milliGRAM(s) Oral daily  doxazosin 2 milliGRAM(s) Oral at bedtime  heparin  Injectable 5000 Unit(s) SubCutaneous every 12 hours    MEDICATIONS  (PRN):  acetaminophen  Suppository .. 650 milliGRAM(s) Rectal every 6 hours PRN Temp greater or equal to 38C (100.4F)  guaiFENesin   Syrup  (Sugar-Free) 100 milliGRAM(s) Oral every 6 hours PRN Cough      Vital Signs Last 24 Hrs  T(C): 36.4 (06 Apr 2020 09:49), Max: 36.6 (05 Apr 2020 16:38)  T(F): 97.5 (06 Apr 2020 09:49), Max: 97.9 (05 Apr 2020 16:38)  HR: 113 (06 Apr 2020 09:49) (89 - 113)  BP: 129/54 (06 Apr 2020 09:49) (120/86 - 129/54)  BP(mean): --  RR: 22 (05 Apr 2020 16:38) (22 - 22)  SpO2: 80% (06 Apr 2020 09:49) (80% - 97%)  CAPILLARY BLOOD GLUCOSE    LABS:    No New Labs today    COVID-19 PCR . (04.03.20 @ 23:59)    COVID-19 PCR: Detected: This test has been validated by [employer] to be accurate;  though it has not been FDA cleared/approved by the usual pathway.  As with all laboratory tests, results should be correlated with clinical  findings.  https://www.fda.gov/media/649599/download  https://www.fda.gov/media/822963/download        RADIOLOGY & ADDITIONAL TESTS:    < from: Xray Chest 1 View-PORTABLE IMMEDIATE (04.03.20 @ 15:11) >  FINDINGS:   The lungs  are clear.  No pleural abnormality is seen.    Heart size within normal limits allowing for magnification effect of AP projection. Aortic arch calcified.  Visualized osseous structures are intact.        IMPRESSION:   No evidence of active chest disease.    < end of copied text >

## 2020-04-06 NOTE — PROGRESS NOTE ADULT - ASSESSMENT
85M with PMHx of HIV, CVA, HTN, HLD, dementia, and prostate CA sent from [place] for shortness of breath and hypoxia. As per transfer papers, Sat 40% on room air.  In ED, Sat 93% with NRB at 15L. Pt non-verbal and not able to give additional info.  History obtained from transfer papers and PMD - Dr. Nguyen. Unable to reach emergency .

## 2020-04-06 NOTE — PROGRESS NOTE ADULT - ATTENDING COMMENTS
PATIENT'S CARE D/W STAFF  - HYPOXIC RESPIRATORY FAILURE DUE TO COVID 19 PNEUMONIA , AND SEPSIS, WORSENING HYPOXIA   - ARF, CKD , HYPERNATREMIA - WILL ADD D5, 1/2 NS IVF  - AMS DUE TO METABOLIC ENCEPHALOPATHY, ADVANCED VASCULAR DEMENTIA   -  HIV - STABLE ON ANTIRETROVIRALS  - PROGNOSIS IS POOR. PALLIATIVE CARE IS IN PROGRESS, D/W FAMILY - DNR / DNI / COMFORT CARE   - GI AND DVT PROPHYLAXIS  - DR. SWENSON PATIENT'S CARE D/W STAFF  - PATIENT IS DECLINING IN PALLIATIVE CARE  - HYPOXIC RESPIRATORY FAILURE DUE TO COVID 19 PNEUMONIA , AND SEPSIS, WORSENING HYPOXIA   - ARF, CKD , HYPERNATREMIA - WILL ADD D5, 1/2 NS IVF  - AMS DUE TO METABOLIC ENCEPHALOPATHY, ADVANCED VASCULAR DEMENTIA   -  HIV - STABLE ON ANTIRETROVIRALS  - PROGNOSIS IS POOR. PALLIATIVE CARE IS IN PROGRESS, D/W FAMILY - DNR / DNI / COMFORT CARE   - GI AND DVT PROPHYLAXIS  - DR. SWENSON

## 2020-04-06 NOTE — PROGRESS NOTE ADULT - PROBLEM SELECTOR PLAN 1
p/w hypoxia   - Respiratory status- Not in distress, S02 95% on NRB @ 15 Liters (patient did not tolerate Nasal [place], desat 02 to 80s)  - T- afebrile  - f/u new labs on 4/7/20  - CXR -- No evidence of active chest disease.   - will d/w attending regarding discontinue Rocephin  and starting Plaquenil and Azithromycin regimen for Covid+ PNA  - Tylenol, Robitussin PRN  - Vit. C, thiamine  - Positive covid 19 with contact and airborne isolation precaution

## 2020-04-07 RX ADMIN — SODIUM CHLORIDE 75 MILLILITER(S): 9 INJECTION, SOLUTION INTRAVENOUS at 10:59

## 2020-04-07 RX ADMIN — CEFTRIAXONE 100 MILLIGRAM(S): 500 INJECTION, POWDER, FOR SOLUTION INTRAMUSCULAR; INTRAVENOUS at 17:47

## 2020-04-07 RX ADMIN — AZITHROMYCIN 255 MILLIGRAM(S): 500 TABLET, FILM COATED ORAL at 17:45

## 2020-04-07 RX ADMIN — HEPARIN SODIUM 5000 UNIT(S): 5000 INJECTION INTRAVENOUS; SUBCUTANEOUS at 05:52

## 2020-04-07 RX ADMIN — HEPARIN SODIUM 5000 UNIT(S): 5000 INJECTION INTRAVENOUS; SUBCUTANEOUS at 17:44

## 2020-04-07 NOTE — PROGRESS NOTE ADULT - SUBJECTIVE AND OBJECTIVE BOX
Patient is a 85y old  Male who presents with a chief complaint of hypoxia (05 Apr 2020 18:12) (06 Apr 2020 15:19)    PATIENT IS SEEN AND EXAMINED IN MEDICAL FLOOR.  QING [    ]    VANESSA [   ]      GT [   ]    ALLERGIES:  No Known Allergies      Daily     Daily     VITALS:    Vital Signs Last 24 Hrs  T(C): 36.5 (07 Apr 2020 16:23), Max: 36.5 (07 Apr 2020 16:23)  T(F): 97.7 (07 Apr 2020 16:23), Max: 97.7 (07 Apr 2020 16:23)  HR: 86 (07 Apr 2020 16:23) (86 - 102)  BP: 122/86 (07 Apr 2020 16:23) (108/61 - 123/93)  BP(mean): --  RR: 20 (07 Apr 2020 16:23) (16 - 21)  SpO2: 100% (07 Apr 2020 16:23) (77% - 100%)    LABS:              CAPILLARY BLOOD GLUCOSE              Creatinine Trend: 4.52<--, see note<--, 5.66<--  I&O's Summary    06 Apr 2020 07:01  -  07 Apr 2020 07:00  --------------------------------------------------------  IN: 0 mL / OUT: 2 mL / NET: -2 mL                MEDICATIONS:    MEDICATIONS  (STANDING):  abacavir 600 mG/lamivudine 300 mG 1 Tablet(s) Oral daily  aspirin  chewable 81 milliGRAM(s) Oral daily  atorvastatin 40 milliGRAM(s) Oral at bedtime  azithromycin  IVPB      azithromycin  IVPB 500 milliGRAM(s) IV Intermittent every 24 hours  buPROPion XL . 150 milliGRAM(s) Oral daily  cefTRIAXone   IVPB 1000 milliGRAM(s) IV Intermittent every 24 hours  cefTRIAXone   IVPB      clopidogrel Tablet 75 milliGRAM(s) Oral daily  dextrose 5% + sodium chloride 0.45%. 1000 milliLiter(s) (75 mL/Hr) IV Continuous <Continuous>  dolutegravir 50 milliGRAM(s) Oral daily  doxazosin 2 milliGRAM(s) Oral at bedtime  heparin  Injectable 5000 Unit(s) SubCutaneous every 12 hours      MEDICATIONS  (PRN):  acetaminophen  Suppository .. 650 milliGRAM(s) Rectal every 6 hours PRN Temp greater or equal to 38C (100.4F)  guaiFENesin   Syrup  (Sugar-Free) 100 milliGRAM(s) Oral every 6 hours PRN Cough      REVIEW OF SYSTEMS:                           ALL ROS DONE [ X   ]    CONSTITUTIONAL:  LETHARGIC [   ], FEVER [   ], UNRESPONSIVE [   ]  CVS:  CP  [   ], SOB, [   ], PALPITATIONS [   ], DIZZYNESS [   ]  RS: COUGH [   ], SPUTUM [   ]  GI: ABDOMINAL PAIN [   ], NAUSEA [   ], VOMITINGS [   ], DIARRHEA [   ], CONSTIPATION [   ]  :  DYSURIA [   ], NOCTURIA [   ], INCREASED FREQUENCY [   ], DRIBLING [   ],  SKELETAL: PAINFUL JOINTS [   ], SWOLLEN JOINTS [   ], NECK ACHE [   ], LOW BACK ACHE [   ],  SKIN : ULCERS [   ], RASH [   ], ITCHING [   ]  CNS: HEAD ACHE [   ], DOUBLE VISION [   ], BLURRED VISION [   ], AMS / CONFUSION [   ], SEIZURES [   ], WEAKNESS [   ],TINGLING / NUMBNESS [   ]    PHYSICAL EXAMINATION:  GENERAL APPEARANCE: NO DISTRESS  HEENT:  NO PALLOR, NO  JVD,  NO   NODES, NECK SUPPLE  CVS: S1 +, S2 +,   RS: AEEB,  OCCASIONAL  RALES +,   NO RONCHI  ABD: SOFT, NT, NO, BS +  EXT: NO PE  SKIN: WARM,   SKELETAL:  ROM ACCEPTABLE  CNS:  AAO X  0  ,   UNRESPONSIVE    RADIOLOGY :    < from: Xray Chest 1 View-PORTABLE IMMEDIATE (04.03.20 @ 15:11) >    FINDINGS:   The lungs  are clear.  No pleural abnormality is seen.    Heart size within normal limits allowing for magnification effect of AP projection. Aortic arch calcified.  Visualized osseous structures are intact.        IMPRESSION:   No evidence of active chest disease.    < end of copied text >      COVID-19 PCR . (04.03.20 @ 23:59)    COVID-19 PCR: Detected: This test has been validated by [employer] to be accurate;  though it has not been FDA cleared/approved by the usual pathway.  As with all laboratory tests, results should be correlated with clinical  findings.  https://www.fda.gov/media/803576/download  https://www.fda.gov/media/501781/download        ASSESSMENT :     Hypoxemia  Yes  Dementia  History of TIAs  Prostate CA  HTN (hypertension)  Cerebrovascular accident (CVA)  HIV disease      PLAN:    HPI:  85M with PMHx of HIV, CVA, HTN, HLD, dementia, and prostate CA sent from [place] for shortness of breath and hypoxia. As per transfer papers, Sat 40% on room air.  In ED, Sat 93% with NRB at 15L. Pt non-verbal and not able to give additional info.  History obtained from transfer papers and PMD - Dr. Nguyen. (03 Apr 2020 15:32)    - PATIENT IS GRADUALLY DECLINING   - HYPOXIC RESPIRATORY FAILURE DUE TO COVID 19 PNEUMONIA , AND SEPSIS  - ARF, CKD , HYPERNATREMIA   - AMS DUE TO METABOLIC ENCEPHALOPATHY, ADVANCED VASCULAR DEMENTIA   -  HIV - STABLE ON ANTIRETROVIRALS  - PROGNOSIS IS POOR. PALLIATIVE CARE IS IN PROGRESS, D/W FAMILY - DNR / DNI / COMFORT CARE   - GI AND DVT PROPHYLAXIS  - DR. NGUYEN

## 2020-04-08 RX ADMIN — HEPARIN SODIUM 5000 UNIT(S): 5000 INJECTION INTRAVENOUS; SUBCUTANEOUS at 17:15

## 2020-04-08 RX ADMIN — SODIUM CHLORIDE 75 MILLILITER(S): 9 INJECTION, SOLUTION INTRAVENOUS at 18:55

## 2020-04-08 RX ADMIN — CEFTRIAXONE 100 MILLIGRAM(S): 500 INJECTION, POWDER, FOR SOLUTION INTRAMUSCULAR; INTRAVENOUS at 17:48

## 2020-04-08 RX ADMIN — HEPARIN SODIUM 5000 UNIT(S): 5000 INJECTION INTRAVENOUS; SUBCUTANEOUS at 06:12

## 2020-04-08 NOTE — PROGRESS NOTE ADULT - SUBJECTIVE AND OBJECTIVE BOX
Patient is a 85y old  Male who presents with a chief complaint of HYPOXIA (07 Apr 2020 22:10)    PATIENT IS SEEN AND EXAMINED IN MEDICAL FLOOR.  QING [    ]    VAENSSA [   ]      GT [   ]    ALLERGIES:  No Known Allergies      Daily     Daily     VITALS:    Vital Signs Last 24 Hrs  T(C): 36.7 (08 Apr 2020 16:18), Max: 36.8 (08 Apr 2020 09:06)  T(F): 98.1 (08 Apr 2020 16:18), Max: 98.3 (08 Apr 2020 09:06)  HR: 96 (08 Apr 2020 16:18) (96 - 101)  BP: 140/96 (08 Apr 2020 16:18) (137/93 - 172/88)  BP(mean): --  RR: 18 (08 Apr 2020 16:18) (18 - 20)  SpO2: 94% (08 Apr 2020 16:18) (91% - 98%)    LABS:              CAPILLARY BLOOD GLUCOSE              Creatinine Trend: 4.52<--, see note<--, 5.66<--  I&O's Summary    07 Apr 2020 07:01  -  08 Apr 2020 07:00  --------------------------------------------------------  IN: 0 mL / OUT: 1 mL / NET: -1 mL    08 Apr 2020 07:01  -  08 Apr 2020 21:17  --------------------------------------------------------  IN: 0 mL / OUT: 1 mL / NET: -1 mL                MEDICATIONS:    MEDICATIONS  (STANDING):  abacavir 600 mG/lamivudine 300 mG 1 Tablet(s) Oral daily  aspirin  chewable 81 milliGRAM(s) Oral daily  atorvastatin 40 milliGRAM(s) Oral at bedtime  azithromycin  IVPB      azithromycin  IVPB 500 milliGRAM(s) IV Intermittent every 24 hours  buPROPion XL . 150 milliGRAM(s) Oral daily  clopidogrel Tablet 75 milliGRAM(s) Oral daily  dextrose 5% + sodium chloride 0.45%. 1000 milliLiter(s) (75 mL/Hr) IV Continuous <Continuous>  dolutegravir 50 milliGRAM(s) Oral daily  doxazosin 2 milliGRAM(s) Oral at bedtime  heparin  Injectable 5000 Unit(s) SubCutaneous every 12 hours      MEDICATIONS  (PRN):  acetaminophen  Suppository .. 650 milliGRAM(s) Rectal every 6 hours PRN Temp greater or equal to 38C (100.4F)  guaiFENesin   Syrup  (Sugar-Free) 100 milliGRAM(s) Oral every 6 hours PRN Cough      REVIEW OF SYSTEMS:                           ALL ROS DONE [ X   ]    CONSTITUTIONAL:  LETHARGIC [   ], FEVER [   ], UNRESPONSIVE [   ]  CVS:  CP  [   ], SOB, [   ], PALPITATIONS [   ], DIZZYNESS [   ]  RS: COUGH [   ], SPUTUM [   ]  GI: ABDOMINAL PAIN [   ], NAUSEA [   ], VOMITINGS [   ], DIARRHEA [   ], CONSTIPATION [   ]  :  DYSURIA [   ], NOCTURIA [   ], INCREASED FREQUENCY [   ], DRIBLING [   ],  SKELETAL: PAINFUL JOINTS [   ], SWOLLEN JOINTS [   ], NECK ACHE [   ], LOW BACK ACHE [   ],  SKIN : ULCERS [   ], RASH [   ], ITCHING [   ]  CNS: HEAD ACHE [   ], DOUBLE VISION [   ], BLURRED VISION [   ], AMS / CONFUSION [   ], SEIZURES [   ], WEAKNESS [   ],TINGLING / NUMBNESS [   ]    PHYSICAL EXAMINATION:  GENERAL APPEARANCE: NO DISTRESS  HEENT:  NO PALLOR, NO  JVD,  NO   NODES, NECK SUPPLE  CVS: S1 +, S2 +,   RS: AEEB,  OCCASIONAL  RALES +,   NO RONCHI  ABD: SOFT, NT, NO, BS +  EXT: NO PE  SKIN: WARM,   SKELETAL:  ROM ACCEPTABLE  CNS:  AAO X  0  ,   UNRESPONSIVE    RADIOLOGY :    < from: Xray Chest 1 View-PORTABLE IMMEDIATE (04.03.20 @ 15:11) >    FINDINGS:   The lungs  are clear.  No pleural abnormality is seen.    Heart size within normal limits allowing for magnification effect of AP projection. Aortic arch calcified.  Visualized osseous structures are intact.        IMPRESSION:   No evidence of active chest disease.    < end of copied text >      COVID-19 PCR . (04.03.20 @ 23:59)    COVID-19 PCR: Detected: This test has been validated by [employer] to be accurate;  though it has not been FDA cleared/approved by the usual pathway.  As with all laboratory tests, results should be correlated with clinical  findings.  https://www.fda.gov/media/424086/download  https://www.fda.gov/media/814631/download        ASSESSMENT :     Hypoxemia  Yes  Dementia  History of TIAs  Prostate CA  HTN (hypertension)  Cerebrovascular accident (CVA)  HIV disease      PLAN:    HPI:  85M with PMHx of HIV, CVA, HTN, HLD, dementia, and prostate CA sent from [place] for shortness of breath and hypoxia. As per transfer papers, Sat 40% on room air.  In ED, Sat 93% with NRB at 15L. Pt non-verbal and not able to give additional info.  History obtained from transfer papers and PMD - Dr. Nguyen. (03 Apr 2020 15:32)    - PATIENT IS GRADUALLY DECLINING AND PROGNOSIS IS POOR.  - HYPOXIC RESPIRATORY FAILURE DUE TO COVID 19 PNEUMONIA , AND SEPSIS  - ARF, CKD , HYPERNATREMIA   - AMS DUE TO METABOLIC ENCEPHALOPATHY, ADVANCED VASCULAR DEMENTIA   -  HIV - ON ANTIRETROVIRALS  - PROGNOSIS IS POOR. PALLIATIVE CARE IS IN PROGRESS, D/W FAMILY - DNR / DNI / COMFORT CARE   - GI AND DVT PROPHYLAXIS  - DR. NGUYEN

## 2020-04-09 RX ADMIN — AZITHROMYCIN 255 MILLIGRAM(S): 500 TABLET, FILM COATED ORAL at 17:44

## 2020-04-09 RX ADMIN — HEPARIN SODIUM 5000 UNIT(S): 5000 INJECTION INTRAVENOUS; SUBCUTANEOUS at 17:50

## 2020-04-09 RX ADMIN — HEPARIN SODIUM 5000 UNIT(S): 5000 INJECTION INTRAVENOUS; SUBCUTANEOUS at 05:27

## 2020-04-09 NOTE — PROGRESS NOTE ADULT - SUBJECTIVE AND OBJECTIVE BOX
GEORGE YOUSIF  85y  [id number]    Patient is a 85y old  Male who presents with a chief complaint of hypoxia (08 Apr 2020 21:17)      HPI:  85M with PMHx of HIV, CVA, HTN, HLD, dementia, and prostate CA sent from [place] for shortness of breath and hypoxia. As per transfer papers, Sat 40% on room air.  In ED, Sat 93% with NRB at 15L. Pt non-verbal and not able to give additional info.  History obtained from transfer papers and PMD - Dr. Nguyen. (03 Apr 2020 15:32)   (on admission)    Hospital day#9  Events last 24 hours: clinical status deteriorating, gradually declining. poor prognosis  status unchanged. pt AMS with pmhx as mentioned above.  palliative care consult called and plan is CMO   unable to obtain ros, pt is ams.    PAST MEDICAL & SURGICAL HISTORY:  Dementia  History of TIAs  Prostate CA  HTN (hypertension)  Cerebrovascular accident (CVA)  HIV disease        ROS:  EYES: No eye pain, visual disturbances, or discharge  NECK: No pain or stiffness  RESPIRATORY: No cough, wheezing, chills or hemoptysis; No shortness of breath  CARDIOVASCULAR: No chest pain, palpitations, dizziness, or leg swelling  GASTROINTESTINAL: No abdominal or epigastric pain. No nausea, vomiting, or hematemesis; No diarrhea or constipation. No melena or hematochezia.  GENITOURINARY: No dysuria, frequency, hematuria, or incontinence  NEUROLOGICAL: No headaches, memory loss, loss of strength, numbness, or tremors  SKIN: No itching, burning, rashes, or lesions   MUSCULOSKELETAL: No joint pain or swelling; No muscle, back, or extremity pain  PSYCHIATRIC: No depression, anxiety, mood swings, or difficulty sleeping      MEDICATIONS:  abacavir 600 mG/lamivudine 300 mG 1 Tablet(s) Oral daily  azithromycin  IVPB      azithromycin  IVPB 500 milliGRAM(s) IV Intermittent every 24 hours  dolutegravir 50 milliGRAM(s) Oral daily    doxazosin 2 milliGRAM(s) Oral at bedtime    guaiFENesin   Syrup  (Sugar-Free) 100 milliGRAM(s) Oral every 6 hours PRN    acetaminophen  Suppository .. 650 milliGRAM(s) Rectal every 6 hours PRN  buPROPion XL . 150 milliGRAM(s) Oral daily      aspirin  chewable 81 milliGRAM(s) Oral daily  clopidogrel Tablet 75 milliGRAM(s) Oral daily  heparin  Injectable 5000 Unit(s) SubCutaneous every 12 hours    atorvastatin 40 milliGRAM(s) Oral at bedtime    dextrose 5% + sodium chloride 0.45%. 1000 milliLiter(s) IV Continuous <Continuous>      VITALS:  T(C): 36.3 (04-09-20 @ 08:17), Max: 36.9 (04-09-20 @ 00:08)  HR: 96 (04-09-20 @ 08:17) (96 - 110)  BP: 141/91 (04-09-20 @ 08:17) (140/88 - 141/91)  RR: 18 (04-09-20 @ 08:17) (18 - 20)  SpO2: 100% (04-09-20 @ 08:17) (94% - 100%)  I&O's Detail    08 Apr 2020 07:01  -  09 Apr 2020 07:00  --------------------------------------------------------  IN:  Total IN: 0 mL    OUT:    Voided: 1 mL  Total OUT: 1 mL    Total NET: -1 mL          PHYSICAL EXAM:  General: No acute distress.  Alert, oriented, interactive, nonfocal  HEENT: Pupils equal, reactive to light.  Symmetric.  PULM: diminished bilaterally, no significant sputum production  CVS: Regular rate and rhythm, no murmurs, rubs, or gallops  ABD: Soft, nondistended, nontender, normoactive bowel sounds, no masses  EXT: No edema, nontender. 2+pulses upper/lower ext  SKIN: Warm and well perfused, no rashes noted.  NEURO: A&Ox3, strength 5/5 all extremities, cranial nerves grossly intact, no focal deficits    RADIOLOGY: ***  < from: Xray Chest 1 View-PORTABLE IMMEDIATE (04.03.20 @ 15:11) >    EXAM:  XR CHEST PORTABLE IMMED 1V                            PROCEDURE DATE:  04/03/2020          INTERPRETATION:  Portable chest radiograph        CLINICAL INFORMATION:   Short of breath., Fever    TECHNIQUE:  Portable  AP view of the chest was obtained.    COMPARISON: No previous examinations are available for review.    FINDINGS:   The lungs  are clear.  No pleural abnormality is seen.    Heart size within normal limits allowing for magnification effect of AP projection. Aortic arch calcified.  Visualized osseous structures are intact.        IMPRESSION:   No evidence of active chest disease.                    LUCILA CARTER M.D., ATTENDING RADIOLOGIST  This document has been electronically signed. Apr  3 2020  3:22PM                < end of copied text >      A/P: 85yMalewith        #acute hypoxemic respiratory failure, ARDS, COVID-19 viral pneumonia       - c/w oxygenation->  [     ]    Nasal cannula 2L                  [   XXXXXXXXX   ]  NRB mask _15_L       - c/w zithormax       - airbourne isolation       - vitals q4hr  #HIV- c/w HAART  - appliative care consult recommended  - will dw med attding  #CKD- gentle hydration GEORGE YOUSIF  85y  [id number]    Patient is a 85y old  Male who presents with a chief complaint of hypoxia (08 Apr 2020 21:17)      HPI:  85M with PMHx of HIV, CVA, HTN, HLD, dementia, and prostate CA sent from [place] for shortness of breath and hypoxia. As per transfer papers, Sat 40% on room air.  In ED, Sat 93% with NRB at 15L. Pt non-verbal and not able to give additional info.  History obtained from transfer papers and PMD - Dr. Nguyen. (03 Apr 2020 15:32)   (on admission)    Hospital day#9  Events last 24 hours: clinical status deteriorating, gradually declining. poor prognosis  status unchanged. pt AMS with pmhx as mentioned above.  palliative care consult called and plan is CMO   unable to obtain ros, pt is ams.    PAST MEDICAL & SURGICAL HISTORY:  Dementia  History of TIAs  Prostate CA  HTN (hypertension)  Cerebrovascular accident (CVA)  HIV disease        ROS:  EYES: No eye pain, visual disturbances, or discharge  NECK: No pain or stiffness  RESPIRATORY: No cough, wheezing, chills or hemoptysis; No shortness of breath  CARDIOVASCULAR: No chest pain, palpitations, dizziness, or leg swelling  GASTROINTESTINAL: No abdominal or epigastric pain. No nausea, vomiting, or hematemesis; No diarrhea or constipation. No melena or hematochezia.  GENITOURINARY: No dysuria, frequency, hematuria, or incontinence  NEUROLOGICAL: No headaches, memory loss, loss of strength, numbness, or tremors  SKIN: No itching, burning, rashes, or lesions   MUSCULOSKELETAL: No joint pain or swelling; No muscle, back, or extremity pain  PSYCHIATRIC: No depression, anxiety, mood swings, or difficulty sleeping      MEDICATIONS:  abacavir 600 mG/lamivudine 300 mG 1 Tablet(s) Oral daily  azithromycin  IVPB      azithromycin  IVPB 500 milliGRAM(s) IV Intermittent every 24 hours  dolutegravir 50 milliGRAM(s) Oral daily    doxazosin 2 milliGRAM(s) Oral at bedtime    guaiFENesin   Syrup  (Sugar-Free) 100 milliGRAM(s) Oral every 6 hours PRN    acetaminophen  Suppository .. 650 milliGRAM(s) Rectal every 6 hours PRN  buPROPion XL . 150 milliGRAM(s) Oral daily      aspirin  chewable 81 milliGRAM(s) Oral daily  clopidogrel Tablet 75 milliGRAM(s) Oral daily  heparin  Injectable 5000 Unit(s) SubCutaneous every 12 hours    atorvastatin 40 milliGRAM(s) Oral at bedtime    dextrose 5% + sodium chloride 0.45%. 1000 milliLiter(s) IV Continuous <Continuous>      VITALS:  T(C): 36.3 (04-09-20 @ 08:17), Max: 36.9 (04-09-20 @ 00:08)  HR: 96 (04-09-20 @ 08:17) (96 - 110)  BP: 141/91 (04-09-20 @ 08:17) (140/88 - 141/91)  RR: 18 (04-09-20 @ 08:17) (18 - 20)  SpO2: 100% (04-09-20 @ 08:17) (94% - 100%)  I&O's Detail    08 Apr 2020 07:01  -  09 Apr 2020 07:00  --------------------------------------------------------  IN:  Total IN: 0 mL    OUT:    Voided: 1 mL  Total OUT: 1 mL    Total NET: -1 mL          PHYSICAL EXAM:  General: No acute distress.  Alert, oriented, interactive, nonfocal  HEENT: Pupils equal, reactive to light.  Symmetric.  PULM: diminished bilaterally, no significant sputum production  CVS: Regular rate and rhythm, no murmurs, rubs, or gallops  ABD: Soft, nondistended, nontender, normoactive bowel sounds, no masses  EXT: No edema, nontender. 2+pulses upper/lower ext  SKIN: Warm and well perfused, no rashes noted.  NEURO: A&Ox3, strength 5/5 all extremities, cranial nerves grossly intact, no focal deficits    RADIOLOGY: ***  < from: Xray Chest 1 View-PORTABLE IMMEDIATE (04.03.20 @ 15:11) >    EXAM:  XR CHEST PORTABLE IMMED 1V                            PROCEDURE DATE:  04/03/2020          INTERPRETATION:  Portable chest radiograph        CLINICAL INFORMATION:   Short of breath., Fever    TECHNIQUE:  Portable  AP view of the chest was obtained.    COMPARISON: No previous examinations are available for review.    FINDINGS:   The lungs  are clear.  No pleural abnormality is seen.    Heart size within normal limits allowing for magnification effect of AP projection. Aortic arch calcified.  Visualized osseous structures are intact.        IMPRESSION:   No evidence of active chest disease.                    LUCILA CARTER M.D., ATTENDING RADIOLOGIST  This document has been electronically signed. Apr  3 2020  3:22PM                < end of copied text >      A/P: 85yMalewith        #acute hypoxemic respiratory failure, ARDS, COVID-19 viral pneumonia       - c/w oxygenation->  [     ]    Nasal cannula 2L                  [   XXXXXXXXX   ]  NRB mask _15_L       - c/w zithormax       - airbourne isolation       - vitals q4hr  #HIV- c/w HAART  - palliative care consult recommended  - will dw med attding  #CKD- gentle hydration

## 2020-04-10 RX ORDER — ROBINUL 0.2 MG/ML
0.2 INJECTION INTRAMUSCULAR; INTRAVENOUS EVERY 6 HOURS
Refills: 0 | Status: DISCONTINUED | OUTPATIENT
Start: 2020-04-10 | End: 2020-04-14

## 2020-04-10 RX ORDER — HALOPERIDOL DECANOATE 100 MG/ML
0.5 INJECTION INTRAMUSCULAR ONCE
Refills: 0 | Status: COMPLETED | OUTPATIENT
Start: 2020-04-10 | End: 2020-04-10

## 2020-04-10 RX ORDER — SODIUM CHLORIDE 9 MG/ML
1000 INJECTION, SOLUTION INTRAVENOUS
Refills: 0 | Status: DISCONTINUED | OUTPATIENT
Start: 2020-04-10 | End: 2020-04-14

## 2020-04-10 RX ORDER — HYDROMORPHONE HYDROCHLORIDE 2 MG/ML
0.5 INJECTION INTRAMUSCULAR; INTRAVENOUS; SUBCUTANEOUS EVERY 4 HOURS
Refills: 0 | Status: DISCONTINUED | OUTPATIENT
Start: 2020-04-10 | End: 2020-04-14

## 2020-04-10 RX ADMIN — HEPARIN SODIUM 5000 UNIT(S): 5000 INJECTION INTRAVENOUS; SUBCUTANEOUS at 05:26

## 2020-04-10 RX ADMIN — HALOPERIDOL DECANOATE 0.5 MILLIGRAM(S): 100 INJECTION INTRAMUSCULAR at 02:18

## 2020-04-10 RX ADMIN — HEPARIN SODIUM 5000 UNIT(S): 5000 INJECTION INTRAVENOUS; SUBCUTANEOUS at 17:48

## 2020-04-10 RX ADMIN — SODIUM CHLORIDE 75 MILLILITER(S): 9 INJECTION, SOLUTION INTRAVENOUS at 12:16

## 2020-04-10 NOTE — PROGRESS NOTE ADULT - ATTENDING COMMENTS
PATIENT'S CARE D/W STAFF    - PATIENT IS GRADUALLY DECLINING AND PROGNOSIS IS POOR. PALLIATIVE CARE CONSULT IS IN PROGRESS  - HYPOXIC RESPIRATORY FAILURE DUE TO COVID 19 PNEUMONIA , AND SEPSIS  - ARF, CKD , HYPERNATREMIA   - AMS DUE TO METABOLIC ENCEPHALOPATHY, ADVANCED VASCULAR DEMENTIA   -  HIV - ON ANTIRETROVIRALS  - PROGNOSIS IS POOR. PALLIATIVE CARE IS IN PROGRESS, D/W FAMILY - DNR / DNI / COMFORT CARE   - GI AND DVT PROPHYLAXIS  - DR. SWENSON

## 2020-04-10 NOTE — CHART NOTE - NSCHARTNOTEFT_GEN_A_CORE
Called by RN to renew patient's b/l mitten restraints.  Patients has history of dementia and per RN report he is agitated and pulls at lines and oxygen mask.   Haldol x 1 dose ordered for agitation.  B/L unsecured mittens renewed.  Continue increased safety checks and to evaluate necessity of restraints as per protocol. Called by RN to renew patient's b/l mitten restraints.  Patients has history of dementia / is agitated and pulls at lines and oxygen mask.   Haldol x 1 dose ordered for agitation.  B/L unsecured mittens renewed.  Continue increased safety checks and to evaluate necessity of restraints as per protocol.

## 2020-04-10 NOTE — PHARMACOTHERAPY INTERVENTION NOTE - COMMENTS
no non-covid19 indication found for azithromycin, patient already completed full duration as well. d/c azithromycin per covid-19 med d/c policy

## 2020-04-10 NOTE — CHART NOTE - NSCHARTNOTEFT_GEN_A_CORE
Palliative Care:  86 y/o man  with PMHx of HIV, CVA, HTN, HLD, dementia, and prostate CA sent from [place] for shortness of breath and hypoxia.  CXR .  No evidence of active chest disease. On NRB satting 95%.  Receiving antibiotics.  DNR/DNI/Comfort care.  -D'c PO meds given pt is lethargic and is unable to swallow  -titrate to nasal cannula  -ativan 1 mg ivp every 4h prn for agitation  -dilaudid 0.5 mg ivp every 4h prn for agitation  -glycopyrrolate 0.2 mg ivp every 6h prn for secretions.

## 2020-04-10 NOTE — PROGRESS NOTE ADULT - SUBJECTIVE AND OBJECTIVE BOX
GEORGE YOUSIF  85y  [id number]    Patient is a 85y old  Male who presents with a chief complaint of hypoxia (08 Apr 2020 21:17)      HPI:  85M with PMHx of HIV, CVA, HTN, HLD, dementia, and prostate CA sent from [place] for shortness of breath and hypoxia. As per transfer papers, Sat 40% on room air.  In ED, Sat 93% with NRB at 15L. Pt non-verbal and not able to give additional info.  History obtained from transfer papers and PMD - Dr. Nguyen. (03 Apr 2020 15:32)   (on admission)    LOS day#7  Events last 24 hours: clinical status deteriorating, gradually declining. poor prognosis  status unchanged. pt AMS with pmhx as mentioned above.  palliative care consult called and plan is CMO   unable to obtain ros, pt is ams.    PAST MEDICAL & SURGICAL HISTORY:  Dementia  History of TIAs  Prostate CA  HTN (hypertension)  Cerebrovascular accident (CVA)  HIV disease        ROS:  EYES: No eye pain, visual disturbances, or discharge  NECK: No pain or stiffness  RESPIRATORY: No cough, wheezing, chills or hemoptysis; No shortness of breath  CARDIOVASCULAR: No chest pain, palpitations, dizziness, or leg swelling  GASTROINTESTINAL: No abdominal or epigastric pain. No nausea, vomiting, or hematemesis; No diarrhea or constipation. No melena or hematochezia.  GENITOURINARY: No dysuria, frequency, hematuria, or incontinence  NEUROLOGICAL: No headaches, memory loss, loss of strength, numbness, or tremors  SKIN: No itching, burning, rashes, or lesions   MUSCULOSKELETAL: No joint pain or swelling; No muscle, back, or extremity pain  PSYCHIATRIC: No depression, anxiety, mood swings, or difficulty sleeping      MEDICATIONS:  abacavir 600 mG/lamivudine 300 mG 1 Tablet(s) Oral daily  azithromycin  IVPB      azithromycin  IVPB 500 milliGRAM(s) IV Intermittent every 24 hours  dolutegravir 50 milliGRAM(s) Oral daily    doxazosin 2 milliGRAM(s) Oral at bedtime    guaiFENesin   Syrup  (Sugar-Free) 100 milliGRAM(s) Oral every 6 hours PRN    acetaminophen  Suppository .. 650 milliGRAM(s) Rectal every 6 hours PRN  buPROPion XL . 150 milliGRAM(s) Oral daily      aspirin  chewable 81 milliGRAM(s) Oral daily  clopidogrel Tablet 75 milliGRAM(s) Oral daily  heparin  Injectable 5000 Unit(s) SubCutaneous every 12 hours    atorvastatin 40 milliGRAM(s) Oral at bedtime    dextrose 5% + sodium chloride 0.45%. 1000 milliLiter(s) IV Continuous <Continuous>      VITALS:  T(C): 36.3 (04-09-20 @ 08:17), Max: 36.9 (04-09-20 @ 00:08)  HR: 96 (04-09-20 @ 08:17) (96 - 110)  BP: 141/91 (04-09-20 @ 08:17) (140/88 - 141/91)  RR: 18 (04-09-20 @ 08:17) (18 - 20)  SpO2: 100% (04-09-20 @ 08:17) (94% - 100%)  I&O's Detail    08 Apr 2020 07:01  -  09 Apr 2020 07:00  --------------------------------------------------------  IN:  Total IN: 0 mL    OUT:    Voided: 1 mL  Total OUT: 1 mL    Total NET: -1 mL          PHYSICAL EXAM:  General: No acute distress.  Alert, oriented, interactive, nonfocal  HEENT: Pupils equal, reactive to light.  Symmetric.  PULM: diminished bilaterally, no significant sputum production  CVS: Regular rate and rhythm, no murmurs, rubs, or gallops  ABD: Soft, nondistended, nontender, normoactive bowel sounds, no masses  EXT: No edema, nontender. 2+pulses upper/lower ext  SKIN: Warm and well perfused, no rashes noted.  NEURO: A&Ox3, strength 5/5 all extremities, cranial nerves grossly intact, no focal deficits    RADIOLOGY: ***  < from: Xray Chest 1 View-PORTABLE IMMEDIATE (04.03.20 @ 15:11) >    EXAM:  XR CHEST PORTABLE IMMED 1V                            PROCEDURE DATE:  04/03/2020          INTERPRETATION:  Portable chest radiograph        CLINICAL INFORMATION:   Short of breath., Fever    TECHNIQUE:  Portable  AP view of the chest was obtained.    COMPARISON: No previous examinations are available for review.    FINDINGS:   The lungs  are clear.  No pleural abnormality is seen.    Heart size within normal limits allowing for magnification effect of AP projection. Aortic arch calcified.  Visualized osseous structures are intact.        IMPRESSION:   No evidence of active chest disease.              LUCILA CARTER M.D., ATTENDING RADIOLOGIST  This document has been electronically signed. Apr  3 2020  3:22PM                < end of copied text >      A/P: 85yMalewith        #acute hypoxemic respiratory failure, ARDS, COVID-19 viral pneumonia       - c/w oxygenation->  [     ]    Nasal cannula 2L                  [   XXXXXXXXX   ]  NRB mask _15_L       - c/w zithormax       - airbourne isolation       - vitals q4hr  #HIV- c/w HAART  - palliative care consult recommended  - will dw med attding  #CKD- gentle hydration GEORGE YOUSIF  85y  [id number]    Patient is a 85y old  Male who presents with a chief complaint of hypoxia (08 Apr 2020 21:17)      HPI:  85M with PMHx of HIV, CVA, HTN, HLD, dementia, and prostate CA sent from [place] for shortness of breath and hypoxia. As per transfer papers, Sat 40% on room air.  In ED, Sat 93% with NRB at 15L. Pt non-verbal and not able to give additional info.  History obtained from transfer papers and PMD - Dr. Nguyen. (03 Apr 2020 15:32)   (on admission)    LOS day#7  Events last 24 hours: clinical status deteriorating, gradually declining. poor prognosis  status unchanged. pt AMS with pmhx as mentioned above.  palliative care consult called and plan is CMO   unable to obtain ros, pt is ams.    PAST MEDICAL & SURGICAL HISTORY:  Dementia  History of TIAs  Prostate CA  HTN (hypertension)  Cerebrovascular accident (CVA)  HIV disease        ROS:  EYES: No eye pain, visual disturbances, or discharge  NECK: No pain or stiffness  RESPIRATORY: No cough, wheezing, chills or hemoptysis; No shortness of breath  CARDIOVASCULAR: No chest pain, palpitations, dizziness, or leg swelling  GASTROINTESTINAL: No abdominal or epigastric pain. No nausea, vomiting, or hematemesis; No diarrhea or constipation. No melena or hematochezia.  GENITOURINARY: No dysuria, frequency, hematuria, or incontinence  NEUROLOGICAL: No headaches, memory loss, loss of strength, numbness, or tremors  SKIN: No itching, burning, rashes, or lesions   MUSCULOSKELETAL: No joint pain or swelling; No muscle, back, or extremity pain  PSYCHIATRIC: No depression, anxiety, mood swings, or difficulty sleeping      MEDICATIONS:  abacavir 600 mG/lamivudine 300 mG 1 Tablet(s) Oral daily  azithromycin  IVPB      azithromycin  IVPB 500 milliGRAM(s) IV Intermittent every 24 hours  dolutegravir 50 milliGRAM(s) Oral daily    doxazosin 2 milliGRAM(s) Oral at bedtime    guaiFENesin   Syrup  (Sugar-Free) 100 milliGRAM(s) Oral every 6 hours PRN    acetaminophen  Suppository .. 650 milliGRAM(s) Rectal every 6 hours PRN  buPROPion XL . 150 milliGRAM(s) Oral daily      aspirin  chewable 81 milliGRAM(s) Oral daily  clopidogrel Tablet 75 milliGRAM(s) Oral daily  heparin  Injectable 5000 Unit(s) SubCutaneous every 12 hours    atorvastatin 40 milliGRAM(s) Oral at bedtime    dextrose 5% + sodium chloride 0.45%. 1000 milliLiter(s) IV Continuous <Continuous>    Vital Signs Last 24 Hrs  T(C): 36.4 (10 Apr 2020 08:55), Max: 36.4 (10 Apr 2020 01:38)  T(F): 97.5 (10 Apr 2020 08:55), Max: 97.5 (10 Apr 2020 01:38)  HR: 85 (10 Apr 2020 08:55) (75 - 101)  BP: 148/100 (10 Apr 2020 08:55) (148/100 - 152/96)  BP(mean): --  RR: 18 (10 Apr 2020 08:55) (18 - 20)  SpO2: 95% (10 Apr 2020 08:55) (95% - 96%)      PHYSICAL EXAM:  General: No acute distress.  Alert, oriented, interactive, nonfocal  HEENT: Pupils equal, reactive to light.  Symmetric.  PULM: diminished bilaterally, no significant sputum production  CVS: Regular rate and rhythm, no murmurs, rubs, or gallops  ABD: Soft, nondistended, nontender, normoactive bowel sounds, no masses  EXT: No edema, nontender. 2+pulses upper/lower ext  SKIN: Warm and well perfused, no rashes noted.  NEURO: A&Ox3, strength 5/5 all extremities, cranial nerves grossly intact, no focal deficits    RADIOLOGY: ***  < from: Xray Chest 1 View-PORTABLE IMMEDIATE (04.03.20 @ 15:11) >    EXAM:  XR CHEST PORTABLE IMMED 1V                            PROCEDURE DATE:  04/03/2020          INTERPRETATION:  Portable chest radiograph        CLINICAL INFORMATION:   Short of breath., Fever    TECHNIQUE:  Portable  AP view of the chest was obtained.    COMPARISON: No previous examinations are available for review.    FINDINGS:   The lungs  are clear.  No pleural abnormality is seen.    Heart size within normal limits allowing for magnification effect of AP projection. Aortic arch calcified.  Visualized osseous structures are intact.        IMPRESSION:   No evidence of active chest disease.              LUCILA CARTER M.D., ATTENDING RADIOLOGIST  This document has been electronically signed. Apr  3 2020  3:22PM                < end of copied text >      · Subjective and Objective:   GEORGE YOUSIF  85y  [id number]    Patient is a 85y old  Male who presents with a chief complaint of hypoxia (08 Apr 2020 21:17)      HPI:  85M with PMHx of HIV, CVA, HTN, HLD, dementia, and prostate CA sent from [place] for shortness of breath and hypoxia. As per transfer papers, Sat 40% on room air.  In ED, Sat 93% with NRB at 15L. Pt non-verbal and not able to give additional info.  History obtained from transfer papers and PMD - Dr. Nguyen. (03 Apr 2020 15:32)   (on admission)    Hospital day#9  Events last 24 hours: clinical status deteriorating, gradually declining. poor prognosis  status unchanged. pt AMS with pmhx as mentioned above.  palliative care consult called and plan is CMO   unable to obtain ros, pt is ams.    PAST MEDICAL & SURGICAL HISTORY:  Dementia  History of TIAs  Prostate CA  HTN (hypertension)  Cerebrovascular accident (CVA)  HIV disease        ROS:  EYES: No eye pain, visual disturbances, or discharge  NECK: No pain or stiffness  RESPIRATORY: No cough, wheezing, chills or hemoptysis; No shortness of breath  CARDIOVASCULAR: No chest pain, palpitations, dizziness, or leg swelling  GASTROINTESTINAL: No abdominal or epigastric pain. No nausea, vomiting, or hematemesis; No diarrhea or constipation. No melena or hematochezia.  GENITOURINARY: No dysuria, frequency, hematuria, or incontinence  NEUROLOGICAL: No headaches, memory loss, loss of strength, numbness, or tremors  SKIN: No itching, burning, rashes, or lesions   MUSCULOSKELETAL: No joint pain or swelling; No muscle, back, or extremity pain  PSYCHIATRIC: No depression, anxiety, mood swings, or difficulty sleeping      MEDICATIONS:  abacavir 600 mG/lamivudine 300 mG 1 Tablet(s) Oral daily  azithromycin  IVPB      azithromycin  IVPB 500 milliGRAM(s) IV Intermittent every 24 hours  dolutegravir 50 milliGRAM(s) Oral daily    doxazosin 2 milliGRAM(s) Oral at bedtime    guaiFENesin   Syrup  (Sugar-Free) 100 milliGRAM(s) Oral every 6 hours PRN    acetaminophen  Suppository .. 650 milliGRAM(s) Rectal every 6 hours PRN  buPROPion XL . 150 milliGRAM(s) Oral daily      aspirin  chewable 81 milliGRAM(s) Oral daily  clopidogrel Tablet 75 milliGRAM(s) Oral daily  heparin  Injectable 5000 Unit(s) SubCutaneous every 12 hours    atorvastatin 40 milliGRAM(s) Oral at bedtime    dextrose 5% + sodium chloride 0.45%. 1000 milliLiter(s) IV Continuous <Continuous>      VITALS:  T(C): 36.3 (04-09-20 @ 08:17), Max: 36.9 (04-09-20 @ 00:08)  HR: 96 (04-09-20 @ 08:17) (96 - 110)  BP: 141/91 (04-09-20 @ 08:17) (140/88 - 141/91)  RR: 18 (04-09-20 @ 08:17) (18 - 20)  SpO2: 100% (04-09-20 @ 08:17) (94% - 100%)  I&O's Detail    08 Apr 2020 07:01  -  09 Apr 2020 07:00  --------------------------------------------------------  IN:  Total IN: 0 mL    OUT:    Voided: 1 mL  Total OUT: 1 mL    Total NET: -1 mL          PHYSICAL EXAM:  General: No acute distress.  Alert, oriented, interactive, nonfocal  HEENT: Pupils equal, reactive to light.  Symmetric.  PULM: diminished bilaterally, no significant sputum production  CVS: Regular rate and rhythm, no murmurs, rubs, or gallops  ABD: Soft, nondistended, nontender, normoactive bowel sounds, no masses  EXT: No edema, nontender. 2+pulses upper/lower ext  SKIN: Warm and well perfused, no rashes noted.  NEURO: A&Ox3, strength 5/5 all extremities, cranial nerves grossly intact, no focal deficits    RADIOLOGY: ***  < from: Xray Chest 1 View-PORTABLE IMMEDIATE (04.03.20 @ 15:11) >    EXAM:  XR CHEST PORTABLE IMMED 1V                            PROCEDURE DATE:  04/03/2020          INTERPRETATION:  Portable chest radiograph        CLINICAL INFORMATION:   Short of breath., Fever    TECHNIQUE:  Portable  AP view of the chest was obtained.    COMPARISON: No previous examinations are available for review.    FINDINGS:   The lungs  are clear.  No pleural abnormality is seen.    Heart size within normal limits allowing for magnification effect of AP projection. Aortic arch calcified.  Visualized osseous structures are intact.        IMPRESSION:   No evidence of active chest disease.                    LUCILA CARTER M.D., ATTENDING RADIOLOGIST  This document has been electronically signed. Apr  3 2020  3:22PM                < end of copied text >      A/P: 85yMalewith        #acute hypoxemic respiratory failure, ARDS, COVID-19 viral pneumonia       - c/w oxygenation->  [     ]    Nasal cannula 2L                  [   XXXXXXXXX   ]  NRB mask _15_L       - c/w zithromax       - airbourne isolation       - vitals q4hr  #HIV- c/w HAART  - palliative care consult recommended  - will dw med attding  #CKD- gentle hydration GEORGE YOUSIF  85y  [id number]    Patient is a 85y old  Male who presents with a chief complaint of hypoxia (08 Apr 2020 21:17)      HPI:  85M with PMHx of HIV, CVA, HTN, HLD, dementia, and prostate CA sent from [place] for shortness of breath and hypoxia. As per transfer papers, Sat 40% on room air.  In ED, Sat 93% with NRB at 15L. Pt non-verbal and not able to give additional info.  History obtained from transfer papers and PMD - Dr. Nguyen. (03 Apr 2020 15:32)   (on admission)    LOS day#7  Events last 24 hours: clinical status deteriorating, gradually declining. poor prognosis  status unchanged. pt AMS with pmhx as mentioned above.  palliative care consult called and plan is CMO   unable to obtain ros, pt is ams.    PAST MEDICAL & SURGICAL HISTORY:  Dementia  History of TIAs  Prostate CA  HTN (hypertension)  Cerebrovascular accident (CVA)  HIV disease        ROS:  EYES: No eye pain, visual disturbances, or discharge  NECK: No pain or stiffness  RESPIRATORY: No cough, wheezing, chills or hemoptysis; No shortness of breath  CARDIOVASCULAR: No chest pain, palpitations, dizziness, or leg swelling  GASTROINTESTINAL: No abdominal or epigastric pain. No nausea, vomiting, or hematemesis; No diarrhea or constipation. No melena or hematochezia.  GENITOURINARY: No dysuria, frequency, hematuria, or incontinence  NEUROLOGICAL: No headaches, memory loss, loss of strength, numbness, or tremors  SKIN: No itching, burning, rashes, or lesions   MUSCULOSKELETAL: No joint pain or swelling; No muscle, back, or extremity pain  PSYCHIATRIC: No depression, anxiety, mood swings, or difficulty sleeping      MEDICATIONS:  abacavir 600 mG/lamivudine 300 mG 1 Tablet(s) Oral daily  azithromycin  IVPB      azithromycin  IVPB 500 milliGRAM(s) IV Intermittent every 24 hours  dolutegravir 50 milliGRAM(s) Oral daily    doxazosin 2 milliGRAM(s) Oral at bedtime    guaiFENesin   Syrup  (Sugar-Free) 100 milliGRAM(s) Oral every 6 hours PRN    acetaminophen  Suppository .. 650 milliGRAM(s) Rectal every 6 hours PRN  buPROPion XL . 150 milliGRAM(s) Oral daily      aspirin  chewable 81 milliGRAM(s) Oral daily  clopidogrel Tablet 75 milliGRAM(s) Oral daily  heparin  Injectable 5000 Unit(s) SubCutaneous every 12 hours    atorvastatin 40 milliGRAM(s) Oral at bedtime    dextrose 5% + sodium chloride 0.45%. 1000 milliLiter(s) IV Continuous <Continuous>    Vital Signs Last 24 Hrs  T(C): 36.4 (10 Apr 2020 08:55), Max: 36.4 (10 Apr 2020 01:38)  T(F): 97.5 (10 Apr 2020 08:55), Max: 97.5 (10 Apr 2020 01:38)  HR: 85 (10 Apr 2020 08:55) (75 - 101)  BP: 148/100 (10 Apr 2020 08:55) (148/100 - 152/96)  BP(mean): --  RR: 18 (10 Apr 2020 08:55) (18 - 20)  SpO2: 95% (10 Apr 2020 08:55) (95% - 96%)      PHYSICAL EXAM:  General: No acute distress.  Alert, oriented, interactive, nonfocal  HEENT: Pupils equal, reactive to light.  Symmetric.  PULM: diminished bilaterally, no significant sputum production  CVS: Regular rate and rhythm, no murmurs, rubs, or gallops  ABD: Soft, nondistended, nontender, normoactive bowel sounds, no masses  EXT: No edema, nontender. 2+pulses upper/lower ext  SKIN: Warm and well perfused, no rashes noted.  NEURO: A&Ox3, strength 5/5 all extremities, cranial nerves grossly intact, no focal deficits    RADIOLOGY: ***  < from: Xray Chest 1 View-PORTABLE IMMEDIATE (04.03.20 @ 15:11) >    EXAM:  XR CHEST PORTABLE IMMED 1V                            PROCEDURE DATE:  04/03/2020          INTERPRETATION:  Portable chest radiograph        CLINICAL INFORMATION:   Short of breath., Fever    TECHNIQUE:  Portable  AP view of the chest was obtained.    COMPARISON: No previous examinations are available for review.    FINDINGS:   The lungs  are clear.  No pleural abnormality is seen.    Heart size within normal limits allowing for magnification effect of AP projection. Aortic arch calcified.  Visualized osseous structures are intact.        IMPRESSION:   No evidence of active chest disease.              LUCILA CARTER M.D., ATTENDING RADIOLOGIST  This document has been electronically signed. Apr  3 2020  3:22PM                < end of copied text >      · Subjective and Objective:   GEORGE YOUSIF  85y  [id number]    Patient is a 85y old  Male who presents with a chief complaint of hypoxia (08 Apr 2020 21:17)      HPI:  85M with PMHx of HIV, CVA, HTN, HLD, dementia, and prostate CA sent from [place] for shortness of breath and hypoxia. As per transfer papers, Sat 40% on room air.  In ED, Sat 93% with NRB at 15L. Pt non-verbal and not able to give additional info.  History obtained from transfer papers and PMD - Dr. Nguyen. (03 Apr 2020 15:32)   (on admission)    Hospital day#9  Events last 24 hours: clinical status deteriorating, gradually declining. poor prognosis  status unchanged. pt AMS with pmhx as mentioned above.  palliative care consult called and plan is CMO   unable to obtain ros, pt is ams.    PAST MEDICAL & SURGICAL HISTORY:  Dementia  History of TIAs  Prostate CA  HTN (hypertension)  Cerebrovascular accident (CVA)  HIV disease        ROS:  EYES: No eye pain, visual disturbances, or discharge  NECK: No pain or stiffness  RESPIRATORY: No cough, wheezing, chills or hemoptysis; No shortness of breath  CARDIOVASCULAR: No chest pain, palpitations, dizziness, or leg swelling  GASTROINTESTINAL: No abdominal or epigastric pain. No nausea, vomiting, or hematemesis; No diarrhea or constipation. No melena or hematochezia.  GENITOURINARY: No dysuria, frequency, hematuria, or incontinence  NEUROLOGICAL: No headaches, memory loss, loss of strength, numbness, or tremors  SKIN: No itching, burning, rashes, or lesions   MUSCULOSKELETAL: No joint pain or swelling; No muscle, back, or extremity pain  PSYCHIATRIC: No depression, anxiety, mood swings, or difficulty sleeping      MEDICATIONS:  abacavir 600 mG/lamivudine 300 mG 1 Tablet(s) Oral daily  azithromycin  IVPB      azithromycin  IVPB 500 milliGRAM(s) IV Intermittent every 24 hours  dolutegravir 50 milliGRAM(s) Oral daily    doxazosin 2 milliGRAM(s) Oral at bedtime    guaiFENesin   Syrup  (Sugar-Free) 100 milliGRAM(s) Oral every 6 hours PRN    acetaminophen  Suppository .. 650 milliGRAM(s) Rectal every 6 hours PRN  buPROPion XL . 150 milliGRAM(s) Oral daily      aspirin  chewable 81 milliGRAM(s) Oral daily  clopidogrel Tablet 75 milliGRAM(s) Oral daily  heparin  Injectable 5000 Unit(s) SubCutaneous every 12 hours    atorvastatin 40 milliGRAM(s) Oral at bedtime    dextrose 5% + sodium chloride 0.45%. 1000 milliLiter(s) IV Continuous <Continuous>      VITALS:  T(C): 36.3 (04-09-20 @ 08:17), Max: 36.9 (04-09-20 @ 00:08)  HR: 96 (04-09-20 @ 08:17) (96 - 110)  BP: 141/91 (04-09-20 @ 08:17) (140/88 - 141/91)  RR: 18 (04-09-20 @ 08:17) (18 - 20)  SpO2: 100% (04-09-20 @ 08:17) (94% - 100%)  I&O's Detail    08 Apr 2020 07:01  -  09 Apr 2020 07:00  --------------------------------------------------------  IN:  Total IN: 0 mL    OUT:    Voided: 1 mL  Total OUT: 1 mL    Total NET: -1 mL          PHYSICAL EXAM:  General: No acute distress.  Alert, oriented, interactive, nonfocal  HEENT: Pupils equal, reactive to light.  Symmetric.  PULM: diminished bilaterally, no significant sputum production  CVS: Regular rate and rhythm, no murmurs, rubs, or gallops  ABD: Soft, nondistended, nontender, normoactive bowel sounds, no masses  EXT: No edema, nontender. 2+pulses upper/lower ext  SKIN: Warm and well perfused, no rashes noted.  NEURO: A&Ox3, strength 5/5 all extremities, cranial nerves grossly intact, no focal deficits    RADIOLOGY: ***  < from: Xray Chest 1 View-PORTABLE IMMEDIATE (04.03.20 @ 15:11) >    EXAM:  XR CHEST PORTABLE IMMED 1V                            PROCEDURE DATE:  04/03/2020          INTERPRETATION:  Portable chest radiograph        CLINICAL INFORMATION:   Short of breath., Fever    TECHNIQUE:  Portable  AP view of the chest was obtained.    COMPARISON: No previous examinations are available for review.    FINDINGS:   The lungs  are clear.  No pleural abnormality is seen.    Heart size within normal limits allowing for magnification effect of AP projection. Aortic arch calcified.  Visualized osseous structures are intact.        IMPRESSION:   No evidence of active chest disease.                    LUCILA CARTER M.D., ATTENDING RADIOLOGIST  This document has been electronically signed. Apr  3 2020  3:22PM                < end of copied text >      A/P: 85yMalewith        #acute hypoxemic respiratory failure, ARDS, COVID-19 viral pneumonia       - c/w oxygenation->  [     ]    Nasal cannula 2L                  [   XXXXXXXXX   ]  NRB mask _15_L       - c/w zithromax       - airbourne isolation       - vitals q4hr  #HIV- c/w HAART  #CKD- gentle hydration  #DNR/DNI/Comfort care. (plan as per Palliative)    -D'c PO meds given pt is lethargic and is unable to swallow    -titrate to nasal cannula    -ativan 1 mg ivp every 4h prn for agitation    -dilaudid 0.5 mg ivp every 4h prn for agitation    -glycopyrrolate 0.2 mg ivp every 6h prn for secretions.

## 2020-04-11 RX ORDER — PROPOFOL 10 MG/ML
10 INJECTION, EMULSION INTRAVENOUS
Qty: 1000 | Refills: 0 | Status: DISCONTINUED | OUTPATIENT
Start: 2020-04-11 | End: 2020-04-11

## 2020-04-11 RX ADMIN — Medication 1 MILLIGRAM(S): at 21:52

## 2020-04-11 RX ADMIN — HEPARIN SODIUM 5000 UNIT(S): 5000 INJECTION INTRAVENOUS; SUBCUTANEOUS at 19:11

## 2020-04-11 RX ADMIN — HEPARIN SODIUM 5000 UNIT(S): 5000 INJECTION INTRAVENOUS; SUBCUTANEOUS at 05:34

## 2020-04-11 RX ADMIN — SODIUM CHLORIDE 75 MILLILITER(S): 9 INJECTION, SOLUTION INTRAVENOUS at 11:37

## 2020-04-11 NOTE — PROGRESS NOTE ADULT - REASON FOR ADMISSION
HYPOXIA
HYPOXIA, SEPSIS
covid
hypoxia
Acute Hypoxemia due to Viral Pneumonia
hypoxia (05 Apr 2020 18:12)

## 2020-04-11 NOTE — PROGRESS NOTE ADULT - SUBJECTIVE AND OBJECTIVE BOX
Patient is a 85y old  Male who presents with a chief complaint of covid (09 Apr 2020 14:13)    PATIENT IS SEEN AND EXAMINED IN MEDICAL FLOOR.  NGT [    ]    MENDEZ [   ]      GT [   ]    ALLERGIES:  No Known Allergies      Daily     Daily     VITALS:    Vital Signs Last 24 Hrs  T(C): 36.3 (11 Apr 2020 08:37), Max: 36.4 (10 Apr 2020 16:15)  T(F): 97.4 (11 Apr 2020 08:37), Max: 97.5 (10 Apr 2020 16:15)  HR: 94 (11 Apr 2020 08:37) (94 - 109)  BP: 140/91 (11 Apr 2020 08:37) (127/98 - 140/91)  BP(mean): --  RR: 18 (11 Apr 2020 08:37) (18 - 19)  SpO2: 91% (11 Apr 2020 08:37) (90% - 94%)    LABS:              CAPILLARY BLOOD GLUCOSE              Creatinine Trend: 4.52<--, see note<--, 5.66<--  I&O's Summary              MEDICATIONS:    MEDICATIONS  (STANDING):  abacavir 600 mG/lamivudine 300 mG 1 Tablet(s) Oral daily  aspirin  chewable 81 milliGRAM(s) Oral daily  clopidogrel Tablet 75 milliGRAM(s) Oral daily  dextrose 5% + sodium chloride 0.9%. 1000 milliLiter(s) (75 mL/Hr) IV Continuous <Continuous>  dolutegravir 50 milliGRAM(s) Oral daily  heparin  Injectable 5000 Unit(s) SubCutaneous every 12 hours      MEDICATIONS  (PRN):  glycopyrrolate Injectable 0.2 milliGRAM(s) IV Push every 6 hours PRN SECRETIONS  HYDROmorphone  Injectable 0.5 milliGRAM(s) IV Push every 4 hours PRN AGITATION  LORazepam   Injectable 1 milliGRAM(s) IV Push every 4 hours PRN Agitation      REVIEW OF SYSTEMS:                           ALL ROS DONE [ X   ]    CONSTITUTIONAL:  LETHARGIC [   ], FEVER [   ], UNRESPONSIVE [   ]  CVS:  CP  [   ], SOB, [   ], PALPITATIONS [   ], DIZZYNESS [   ]  RS: COUGH [   ], SPUTUM [   ]  GI: ABDOMINAL PAIN [   ], NAUSEA [   ], VOMITINGS [   ], DIARRHEA [   ], CONSTIPATION [   ]  :  DYSURIA [   ], NOCTURIA [   ], INCREASED FREQUENCY [   ], DRIBLING [   ],  SKELETAL: PAINFUL JOINTS [   ], SWOLLEN JOINTS [   ], NECK ACHE [   ], LOW BACK ACHE [   ],  SKIN : ULCERS [   ], RASH [   ], ITCHING [   ]  CNS: HEAD ACHE [   ], DOUBLE VISION [   ], BLURRED VISION [   ], AMS / CONFUSION [   ], SEIZURES [   ], WEAKNESS [   ],TINGLING / NUMBNESS [   ]    PHYSICAL EXAMINATION:  GENERAL APPEARANCE: NO DISTRESS  HEENT:  NO PALLOR, NO  JVD,  NO   NODES, NECK SUPPLE  CVS: S1 +, S2 +,   RS: AEEB,  OCCASIONAL  RALES +,   NO RONCHI  ABD: SOFT, NT, NO, BS +  EXT: NO PE  SKIN: WARM,   SKELETAL:  ROM ACCEPTABLE  CNS:  AAO X 0   ,  CONFUSED      COVID-19 PCR: Detected:     This test has been validated by [employer] to be accurate;  though it has not been FDA cleared/approved by the usual pathway.  As with all laboratory tests, results should be correlated with clinical  findings.  https://www.fda.gov/media/547906/download  https://www.fda.gov/media/403660/download (04.03.20 @ 23:59)      RADIOLOGY :    < from: Xray Chest 1 View-PORTABLE IMMEDIATE (04.03.20 @ 15:11) >  FINDINGS:   The lungs  are clear.  No pleural abnormality is seen.    Heart size within normal limits allowing for magnification effect of AP projection. Aortic arch calcified.  Visualized osseous structures are intact.        IMPRESSION:   No evidence of active chest disease.    < end of copied text >        ASSESSMENT :     Hypoxemia  Yes  Dementia  History of TIAs  Prostate CA  HTN (hypertension)  Cerebrovascular accident (CVA)  HIV disease      PLAN:  HPI:  85M with PMHx of HIV, CVA, HTN, HLD, dementia, and prostate CA sent from [place] for shortness of breath and hypoxia. As per transfer papers, Sat 40% on room air.  In ED, Sat 93% with NRB at 15L. Pt non-verbal and not able to give additional info.  History obtained from transfer papers and PMD - Dr. Nguyen. (03 Apr 2020 15:32)      - PATIENT IS GRADUALLY DECLINING AND PROGNOSIS IS POOR. PALLIATIVE CARE CONSULT IS IN PROGRESS  - HYPOXIC RESPIRATORY FAILURE DUE TO COVID 19 PNEUMONIA , AND SEPSIS  - ARF, CKD , HYPERNATREMIA   - AMS DUE TO METABOLIC ENCEPHALOPATHY, ADVANCED VASCULAR DEMENTIA   -  HIV - ON ANTIRETROVIRALS  - PROGNOSIS IS POOR. PALLIATIVE CARE IS IN PROGRESS, D/W FAMILY - DNR / DNI / COMFORT CARE   - GI AND DVT PROPHYLAXIS  - DR. KELSEY NGUYEN  COVERING DR. JULIETTE NGUYEN . Patient is a 85y old  Male who presents with a chief complaint of covid (09 Apr 2020 14:13)    PATIENT IS SEEN AND EXAMINED IN MEDICAL FLOOR.    ALLERGIES:  No Known Allergies    VITALS:    Vital Signs Last 24 Hrs  T(C): 36.3 (11 Apr 2020 08:37), Max: 36.4 (10 Apr 2020 16:15)  T(F): 97.4 (11 Apr 2020 08:37), Max: 97.5 (10 Apr 2020 16:15)  HR: 94 (11 Apr 2020 08:37) (94 - 109)  BP: 140/91 (11 Apr 2020 08:37) (127/98 - 140/91)  BP(mean): --  RR: 18 (11 Apr 2020 08:37) (18 - 19)  SpO2: 91% (11 Apr 2020 08:37) (90% - 94%)    LABS:        CAPILLARY BLOOD GLUCOSE              Creatinine Trend: 4.52<--, see note<--, 5.66<--  I&O's Summary              MEDICATIONS:    MEDICATIONS  (STANDING):  abacavir 600 mG/lamivudine 300 mG 1 Tablet(s) Oral daily  aspirin  chewable 81 milliGRAM(s) Oral daily  clopidogrel Tablet 75 milliGRAM(s) Oral daily  dextrose 5% + sodium chloride 0.9%. 1000 milliLiter(s) (75 mL/Hr) IV Continuous <Continuous>  dolutegravir 50 milliGRAM(s) Oral daily  heparin  Injectable 5000 Unit(s) SubCutaneous every 12 hours      MEDICATIONS  (PRN):  glycopyrrolate Injectable 0.2 milliGRAM(s) IV Push every 6 hours PRN SECRETIONS  HYDROmorphone  Injectable 0.5 milliGRAM(s) IV Push every 4 hours PRN AGITATION  LORazepam   Injectable 1 milliGRAM(s) IV Push every 4 hours PRN Agitation      REVIEW OF SYSTEMS:                           ALL ROS DONE [ X   ]    CONSTITUTIONAL:  LETHARGIC [   ], FEVER [   ], UNRESPONSIVE [   ]  CVS:  CP  [   ], SOB, [   ], PALPITATIONS [   ], DIZZYNESS [   ]  RS: COUGH [   ], SPUTUM [   ]  GI: ABDOMINAL PAIN [   ], NAUSEA [   ], VOMITINGS [   ], DIARRHEA [   ], CONSTIPATION [   ]  :  DYSURIA [   ], NOCTURIA [   ], INCREASED FREQUENCY [   ], DRIBLING [   ],  SKELETAL: PAINFUL JOINTS [   ], SWOLLEN JOINTS [   ], NECK ACHE [   ], LOW BACK ACHE [   ],  SKIN : ULCERS [   ], RASH [   ], ITCHING [   ]  CNS: HEAD ACHE [   ], DOUBLE VISION [   ], BLURRED VISION [   ], AMS / CONFUSION [   ], SEIZURES [   ], WEAKNESS [   ],TINGLING / NUMBNESS [   ]    PHYSICAL EXAMINATION:  GENERAL APPEARANCE: NO DISTRESS  HEENT:  NO PALLOR, NO  JVD,  NO   NODES, NECK SUPPLE  CVS: S1 +, S2 +,   RS: AEEB,  OCCASIONAL  RALES +,   NO RONCHI  ABD: SOFT, NT, NO, BS +  EXT: NO PE  SKIN: WARM,   SKELETAL:  ROM ACCEPTABLE  CNS:  AAO X 0   ,  CONFUSED      COVID-19 PCR: Detected:     This test has been validated by [employer] to be accurate;  though it has not been FDA cleared/approved by the usual pathway.  As with all laboratory tests, results should be correlated with clinical  findings.  https://www.fda.gov/media/625007/download  https://www.fda.gov/media/458975/download (04.03.20 @ 23:59)      RADIOLOGY :    < from: Xray Chest 1 View-PORTABLE IMMEDIATE (04.03.20 @ 15:11) >  FINDINGS:   The lungs  are clear.  No pleural abnormality is seen.    Heart size within normal limits allowing for magnification effect of AP projection. Aortic arch calcified.  Visualized osseous structures are intact.        IMPRESSION:   No evidence of active chest disease.    < end of copied text >        ASSESSMENT :     Hypoxemia  Yes  Dementia  History of TIAs  Prostate CA  HTN (hypertension)  Cerebrovascular accident (CVA)  HIV disease      PLAN:  HPI:  85M with PMHx of HIV, CVA, HTN, HLD, dementia, and prostate CA sent from [place] for shortness of breath and hypoxia. As per transfer papers, Sat 40% on room air.  In ED, Sat 93% with NRB at 15L. Pt non-verbal and not able to give additional info.  History obtained from transfer papers and PMD - Dr. Nguyen. (03 Apr 2020 15:32)      - PATIENT IS GRADUALLY DECLINING AND PROGNOSIS IS POOR. PALLIATIVE CARE CONSULT IS IN PROGRESS  - HYPOXIC RESPIRATORY FAILURE DUE TO COVID 19 PNEUMONIA , AND SEPSIS  - ARF, CKD , HYPERNATREMIA   - AMS DUE TO METABOLIC ENCEPHALOPATHY, ADVANCED VASCULAR DEMENTIA   -  HIV - ON ANTIRETROVIRALS  - PROGNOSIS IS POOR. PALLIATIVE CARE IS IN PROGRESS, D/W FAMILY - DNR / DNI / COMFORT CARE   - GI AND DVT PROPHYLAXIS  - DR. KELSEY NGUYEN  COVERING DR. JULIETTE NGUYEN .

## 2020-04-12 RX ADMIN — HYDROMORPHONE HYDROCHLORIDE 0.5 MILLIGRAM(S): 2 INJECTION INTRAMUSCULAR; INTRAVENOUS; SUBCUTANEOUS at 18:06

## 2020-04-12 RX ADMIN — HEPARIN SODIUM 5000 UNIT(S): 5000 INJECTION INTRAVENOUS; SUBCUTANEOUS at 17:51

## 2020-04-12 RX ADMIN — SODIUM CHLORIDE 75 MILLILITER(S): 9 INJECTION, SOLUTION INTRAVENOUS at 17:58

## 2020-04-12 RX ADMIN — HEPARIN SODIUM 5000 UNIT(S): 5000 INJECTION INTRAVENOUS; SUBCUTANEOUS at 05:18

## 2020-04-12 NOTE — PROGRESS NOTE ADULT - SUBJECTIVE AND OBJECTIVE BOX
Patient is a 85y old  Male who presents with a chief complaint of hypoxia (11 Apr 2020 13:48)    PATIENT IS SEEN AND EXAMINED IN MEDICAL FLOOR.    ALLERGIES:  No Known Allergies    VITALS:    Vital Signs Last 24 Hrs  T(C): 36.3 (12 Apr 2020 16:19), Max: 36.4 (12 Apr 2020 00:35)  T(F): 97.4 (12 Apr 2020 16:19), Max: 97.5 (12 Apr 2020 00:35)  HR: 144 (12 Apr 2020 16:19) (80 - 144)  BP: 116/83 (12 Apr 2020 16:19) (116/83 - 149/105)  BP(mean): --  RR: 18 (12 Apr 2020 16:19) (18 - 19)  SpO2: 91% (12 Apr 2020 16:19) (90% - 95%)    LABS:    CAPILLARY BLOOD GLUCOSE    Creatinine Trend: 4.52<--, see note<--, 5.66<--  I&O's Summary    MEDICATIONS:    MEDICATIONS  (STANDING):  abacavir 600 mG/lamivudine 300 mG 1 Tablet(s) Oral daily  aspirin  chewable 81 milliGRAM(s) Oral daily  clopidogrel Tablet 75 milliGRAM(s) Oral daily  dextrose 5% + sodium chloride 0.9%. 1000 milliLiter(s) (75 mL/Hr) IV Continuous <Continuous>  dolutegravir 50 milliGRAM(s) Oral daily  heparin  Injectable 5000 Unit(s) SubCutaneous every 12 hours      MEDICATIONS  (PRN):  glycopyrrolate Injectable 0.2 milliGRAM(s) IV Push every 6 hours PRN SECRETIONS  HYDROmorphone  Injectable 0.5 milliGRAM(s) IV Push every 4 hours PRN AGITATION  LORazepam   Injectable 1 milliGRAM(s) IV Push every 4 hours PRN Agitation      REVIEW OF SYSTEMS:                           ALL ROS DONE [ X   ]    CONSTITUTIONAL:  LETHARGIC [   ], FEVER [   ], UNRESPONSIVE [   ]  CVS:  CP  [   ], SOB, [   ], PALPITATIONS [   ], DIZZYNESS [   ]  RS: COUGH [   ], SPUTUM [   ]  GI: ABDOMINAL PAIN [   ], NAUSEA [   ], VOMITINGS [   ], DIARRHEA [   ], CONSTIPATION [   ]  :  DYSURIA [   ], NOCTURIA [   ], INCREASED FREQUENCY [   ], DRIBLING [   ],  SKELETAL: PAINFUL JOINTS [   ], SWOLLEN JOINTS [   ], NECK ACHE [   ], LOW BACK ACHE [   ],  SKIN : ULCERS [   ], RASH [   ], ITCHING [   ]  CNS: HEAD ACHE [   ], DOUBLE VISION [   ], BLURRED VISION [   ], AMS / CONFUSION [   ], SEIZURES [   ], WEAKNESS [   ],TINGLING / NUMBNESS [   ]    PHYSICAL EXAMINATION:  GENERAL APPEARANCE: NO DISTRESS  HEENT:  NO PALLOR, NO  JVD,  NO   NODES, NECK SUPPLE  CVS: S1 +, S2 +,   RS: AEEB,  OCCASIONAL  RALES +,   NO RONCHI  ABD: SOFT, NT, NO, BS +  EXT: NO PE  SKIN: WARM,   SKELETAL:  ROM ACCEPTABLE  CNS:  AAO X 0   ,  CONFUSED      COVID-19 PCR: Detected:     This test has been validated by [employer] to be accurate;  though it has not been FDA cleared/approved by the usual pathway.  As with all laboratory tests, results should be correlated with clinical  findings.  https://www.fda.gov/media/412013/download  https://www.fda.gov/media/642350/download (04.03.20 @ 23:59)      RADIOLOGY :    < from: Xray Chest 1 View-PORTABLE IMMEDIATE (04.03.20 @ 15:11) >  FINDINGS:   The lungs  are clear.  No pleural abnormality is seen.    Heart size within normal limits allowing for magnification effect of AP projection. Aortic arch calcified.  Visualized osseous structures are intact.        IMPRESSION:   No evidence of active chest disease.    < end of copied text >        ASSESSMENT :     Hypoxemia  Yes  Dementia  History of TIAs  Prostate CA  HTN (hypertension)  Cerebrovascular accident (CVA)  HIV disease      PLAN:  HPI:  85M with PMHx of HIV, CVA, HTN, HLD, dementia, and prostate CA sent from [place] for shortness of breath and hypoxia. As per transfer papers, Sat 40% on room air.  In ED, Sat 93% with NRB at 15L. Pt non-verbal and not able to give additional info.  History obtained from transfer papers and PMD - Dr. Nguyen. (03 Apr 2020 15:32)      - PATIENT IS GRADUALLY DECLINING AND PROGNOSIS IS POOR. PALLIATIVE CARE CONSULT IS IN PROGRESS  - HYPOXIC RESPIRATORY FAILURE DUE TO COVID 19 PNEUMONIA , AND SEPSIS  - ARF, CKD , HYPERNATREMIA   - AMS DUE TO METABOLIC ENCEPHALOPATHY, ADVANCED VASCULAR DEMENTIA   -  HIV - ON ANTIRETROVIRALS  - PROGNOSIS IS POOR. PALLIATIVE CARE IS IN PROGRESS, D/W FAMILY - DNR / DNI / COMFORT CARE   - GI AND DVT PROPHYLAXIS  - DR. KELSEY NGUYEN  COVERING DR. JULIETTE NGUYEN

## 2020-04-13 RX ADMIN — SODIUM CHLORIDE 75 MILLILITER(S): 9 INJECTION, SOLUTION INTRAVENOUS at 09:00

## 2020-04-13 RX ADMIN — HEPARIN SODIUM 5000 UNIT(S): 5000 INJECTION INTRAVENOUS; SUBCUTANEOUS at 05:19

## 2020-04-13 RX ADMIN — HEPARIN SODIUM 5000 UNIT(S): 5000 INJECTION INTRAVENOUS; SUBCUTANEOUS at 18:51

## 2020-04-13 NOTE — PROGRESS NOTE ADULT - SUBJECTIVE AND OBJECTIVE BOX
Patient is a 85y old  Male who presents with a chief complaint of hypoxia (11 Apr 2020 13:48)    PATIENT IS SEEN AND EXAMINED IN MEDICAL FLOOR.  NGT [    ]    MENDEZ [   ]      GT [   ]    ALLERGIES:  No Known Allergies      Daily     Daily     VITALS:    Vital Signs Last 24 Hrs  T(C): 36.5 (13 Apr 2020 16:06), Max: 36.5 (13 Apr 2020 16:06)  T(F): 97.7 (13 Apr 2020 16:06), Max: 97.7 (13 Apr 2020 16:06)  HR: 73 (13 Apr 2020 16:06) (55 - 91)  BP: 122/92 (13 Apr 2020 16:06) (122/92 - 144/73)  BP(mean): --  RR: 20 (13 Apr 2020 16:06) (18 - 20)  SpO2: 84% (13 Apr 2020 16:06) (84% - 99%)    LABS:              CAPILLARY BLOOD GLUCOSE              Creatinine Trend: 4.52<--, see note<--, 5.66<--  I&O's Summary              MEDICATIONS:    MEDICATIONS  (STANDING):  abacavir 600 mG/lamivudine 300 mG 1 Tablet(s) Oral daily  aspirin  chewable 81 milliGRAM(s) Oral daily  clopidogrel Tablet 75 milliGRAM(s) Oral daily  dextrose 5% + sodium chloride 0.9%. 1000 milliLiter(s) (75 mL/Hr) IV Continuous <Continuous>  dolutegravir 50 milliGRAM(s) Oral daily  heparin  Injectable 5000 Unit(s) SubCutaneous every 12 hours      MEDICATIONS  (PRN):  glycopyrrolate Injectable 0.2 milliGRAM(s) IV Push every 6 hours PRN SECRETIONS  HYDROmorphone  Injectable 0.5 milliGRAM(s) IV Push every 4 hours PRN AGITATION  LORazepam   Injectable 1 milliGRAM(s) IV Push every 4 hours PRN Agitation      REVIEW OF SYSTEMS:                           ALL ROS DONE [ X   ]    CONSTITUTIONAL:  LETHARGIC [   ], FEVER [   ], UNRESPONSIVE [   ]  CVS:  CP  [   ], SOB, [   ], PALPITATIONS [   ], DIZZYNESS [   ]  RS: COUGH [   ], SPUTUM [   ]  GI: ABDOMINAL PAIN [   ], NAUSEA [   ], VOMITINGS [   ], DIARRHEA [   ], CONSTIPATION [   ]  :  DYSURIA [   ], NOCTURIA [   ], INCREASED FREQUENCY [   ], DRIBLING [   ],  SKELETAL: PAINFUL JOINTS [   ], SWOLLEN JOINTS [   ], NECK ACHE [   ], LOW BACK ACHE [   ],  SKIN : ULCERS [   ], RASH [   ], ITCHING [   ]  CNS: HEAD ACHE [   ], DOUBLE VISION [   ], BLURRED VISION [   ], AMS / CONFUSION [   ], SEIZURES [   ], WEAKNESS [   ],TINGLING / NUMBNESS [   ]      PHYSICAL EXAMINATION:  GENERAL APPEARANCE: NO DISTRESS  HEENT:  NO PALLOR, NO  JVD,  NO   NODES, NECK SUPPLE  CVS: S1 +, S2 +,   RS: AEEB,  OCCASIONAL  RALES +,   NO RONCHI  ABD: SOFT, NT, NO, BS +  EXT: NO PE  SKIN: WARM,   SKELETAL:  ROM ACCEPTABLE  CNS:  AAO X 0   ,  CONFUSED      COVID-19 PCR: Detected:     This test has been validated by [employer] to be accurate;  though it has not been FDA cleared/approved by the usual pathway.  As with all laboratory tests, results should be correlated with clinical  findings.  https://www.fda.gov/media/960637/download  https://www.fda.gov/media/559641/download (04.03.20 @ 23:59)      RADIOLOGY :    < from: Xray Chest 1 View-PORTABLE IMMEDIATE (04.03.20 @ 15:11) >  FINDINGS:   The lungs  are clear.  No pleural abnormality is seen.    Heart size within normal limits allowing for magnification effect of AP projection. Aortic arch calcified.  Visualized osseous structures are intact.        IMPRESSION:   No evidence of active chest disease.    < end of copied text >    ASSESSMENT :     Hypoxemia  Yes  Dementia  History of TIAs  Prostate CA  HTN (hypertension)  Cerebrovascular accident (CVA)  HIV disease      PLAN:  HPI:  85M with PMHx of HIV, CVA, HTN, HLD, dementia, and prostate CA sent from [place] for shortness of breath and hypoxia. As per transfer papers, Sat 40% on room air.  In ED, Sat 93% with NRB at 15L. Pt non-verbal and not able to give additional info.  History obtained from transfer papers and PMD - Dr. Nguyen. (03 Apr 2020 15:32)      - PATIENT IS GRADUALLY DECLINING AND PROGNOSIS IS POOR. PALLIATIVE CARE CONSULT IS IN PROGRESS  - HYPOXIC RESPIRATORY FAILURE DUE TO COVID 19 PNEUMONIA , AND SEPSIS  - ARF, CKD , HYPERNATREMIA   - AMS DUE TO METABOLIC ENCEPHALOPATHY, ADVANCED VASCULAR DEMENTIA   -  HIV - ON ANTIRETROVIRALS  - PROGNOSIS IS POOR. PALLIATIVE CARE IS IN PROGRESS, D/W FAMILY - DNR / DNI / COMFORT CARE   - D/C PLANNING TO [facility] FOR HOSPICE CARE  - GI AND DVT PROPHYLAXIS  - DR. KELSEY NGUYEN  COVERING DR. JULIETTE NGUYEN

## 2020-04-13 NOTE — PROGRESS NOTE ADULT - ASSESSMENT
-Admitted with HYPOXIC RESPIRATORY FAILURE DUE TO COVID 19 PNEUMONIA , AND SEPSIS  - ARF, CKD , HYPERNATREMIA   - AMS DUE TO METABOLIC ENCEPHALOPATHY, ADVANCED VASCULAR DEMENTIA   -  HIV - ON ANTIRETROVIRALS  - PALLIATIVE CARE consulted, pt DNR / DNI / COMFORT CARE   - GI AND DVT PROPHYLAXIS  observation  Monitor o2 sat on RA.  will discuss with attending possible discharge planning if remains with adequate o2 sat on RA

## 2020-04-13 NOTE — PROGRESS NOTE ADULT - SUBJECTIVE AND OBJECTIVE BOX
85M with PMHx of HIV, CVA, HTN, HLD, dementia, and prostate CA sent from [place] for shortness of breath and hypoxia. As per transfer papers, Sat 40% on room air.  In ED, Sat 93% with NRB at 15L. Pt non-verbal and not able to give additional info.    Presently o2Sat in mid to high 90's on NC and maintaining sat in this range with pt taking off NC. (uncooperative patient)    ICU Vital Signs Last 24 Hrs  T(C): 36.3 (13 Apr 2020 00:49), Max: 36.3 (12 Apr 2020 16:19)  T(F): 97.4 (13 Apr 2020 00:49), Max: 97.4 (12 Apr 2020 16:19)  HR: 91 (13 Apr 2020 00:49) (91 - 144)  BP: 144/73 (13 Apr 2020 00:49) (116/83 - 144/73)  BP(mean): --  ABP: --  ABP(mean): --  RR: 18 (13 Apr 2020 00:49) (18 - 18)  SpO2: 99% NC 2Lit  Alert , confused (baseline)    Lungs: good air entyr bilat  no calf swelling or erythema

## 2020-04-14 VITALS
SYSTOLIC BLOOD PRESSURE: 91 MMHG | RESPIRATION RATE: 19 BRPM | OXYGEN SATURATION: 98 % | TEMPERATURE: 98 F | DIASTOLIC BLOOD PRESSURE: 67 MMHG | HEART RATE: 75 BPM

## 2020-04-14 LAB
ALBUMIN SERPL ELPH-MCNC: 2.3 G/DL — LOW (ref 3.5–5)
ALP SERPL-CCNC: 138 U/L — HIGH (ref 40–120)
ALT FLD-CCNC: 65 U/L DA — HIGH (ref 10–60)
ANION GAP SERPL CALC-SCNC: 3 MMOL/L — LOW (ref 5–17)
AST SERPL-CCNC: 105 U/L — HIGH (ref 10–40)
BILIRUB SERPL-MCNC: 0.9 MG/DL — SIGNIFICANT CHANGE UP (ref 0.2–1.2)
BUN SERPL-MCNC: 48 MG/DL — HIGH (ref 7–18)
CALCIUM SERPL-MCNC: 8.9 MG/DL — SIGNIFICANT CHANGE UP (ref 8.4–10.5)
CHLORIDE SERPL-SCNC: 153 MMOL/L — HIGH (ref 96–108)
CO2 SERPL-SCNC: 22 MMOL/L — SIGNIFICANT CHANGE UP (ref 22–31)
CREAT SERPL-MCNC: 1.82 MG/DL — HIGH (ref 0.5–1.3)
GLUCOSE SERPL-MCNC: 132 MG/DL — HIGH (ref 70–99)
HCT VFR BLD CALC: 52.3 % — HIGH (ref 39–50)
HGB BLD-MCNC: 16.6 G/DL — SIGNIFICANT CHANGE UP (ref 13–17)
MCHC RBC-ENTMCNC: 29 PG — SIGNIFICANT CHANGE UP (ref 27–34)
MCHC RBC-ENTMCNC: 31.7 GM/DL — LOW (ref 32–36)
MCV RBC AUTO: 91.3 FL — SIGNIFICANT CHANGE UP (ref 80–100)
NRBC # BLD: 0 /100 WBCS — SIGNIFICANT CHANGE UP (ref 0–0)
PLATELET # BLD AUTO: 133 K/UL — LOW (ref 150–400)
POTASSIUM SERPL-MCNC: 3.4 MMOL/L — LOW (ref 3.5–5.3)
POTASSIUM SERPL-SCNC: 3.4 MMOL/L — LOW (ref 3.5–5.3)
PROT SERPL-MCNC: 7.4 G/DL — SIGNIFICANT CHANGE UP (ref 6–8.3)
RBC # BLD: 5.73 M/UL — SIGNIFICANT CHANGE UP (ref 4.2–5.8)
RBC # FLD: 16 % — HIGH (ref 10.3–14.5)
SODIUM SERPL-SCNC: 178 MMOL/L — CRITICAL HIGH (ref 135–145)
WBC # BLD: 8.57 K/UL — SIGNIFICANT CHANGE UP (ref 3.8–10.5)
WBC # FLD AUTO: 8.57 K/UL — SIGNIFICANT CHANGE UP (ref 3.8–10.5)

## 2020-04-14 PROCEDURE — 80053 COMPREHEN METABOLIC PANEL: CPT

## 2020-04-14 PROCEDURE — 36415 COLL VENOUS BLD VENIPUNCTURE: CPT

## 2020-04-14 PROCEDURE — 84145 PROCALCITONIN (PCT): CPT

## 2020-04-14 PROCEDURE — 80061 LIPID PANEL: CPT

## 2020-04-14 PROCEDURE — 93005 ELECTROCARDIOGRAM TRACING: CPT

## 2020-04-14 PROCEDURE — 82607 VITAMIN B-12: CPT

## 2020-04-14 PROCEDURE — 99285 EMERGENCY DEPT VISIT HI MDM: CPT | Mod: 25

## 2020-04-14 PROCEDURE — 85027 COMPLETE CBC AUTOMATED: CPT

## 2020-04-14 PROCEDURE — 82962 GLUCOSE BLOOD TEST: CPT

## 2020-04-14 PROCEDURE — 85652 RBC SED RATE AUTOMATED: CPT

## 2020-04-14 PROCEDURE — 87536 HIV-1 QUANT&REVRSE TRNSCRPJ: CPT

## 2020-04-14 PROCEDURE — 86360 T CELL ABSOLUTE COUNT/RATIO: CPT

## 2020-04-14 PROCEDURE — 71045 X-RAY EXAM CHEST 1 VIEW: CPT

## 2020-04-14 PROCEDURE — 86140 C-REACTIVE PROTEIN: CPT

## 2020-04-14 PROCEDURE — 84443 ASSAY THYROID STIM HORMONE: CPT

## 2020-04-14 PROCEDURE — 84100 ASSAY OF PHOSPHORUS: CPT

## 2020-04-14 PROCEDURE — 83615 LACTATE (LD) (LDH) ENZYME: CPT

## 2020-04-14 PROCEDURE — 82728 ASSAY OF FERRITIN: CPT

## 2020-04-14 PROCEDURE — 85379 FIBRIN DEGRADATION QUANT: CPT

## 2020-04-14 PROCEDURE — 87635 SARS-COV-2 COVID-19 AMP PRB: CPT

## 2020-04-14 PROCEDURE — 83036 HEMOGLOBIN GLYCOSYLATED A1C: CPT

## 2020-04-14 PROCEDURE — 83735 ASSAY OF MAGNESIUM: CPT

## 2020-04-14 RX ORDER — CLOPIDOGREL BISULFATE 75 MG/1
1 TABLET, FILM COATED ORAL
Qty: 0 | Refills: 0 | DISCHARGE
Start: 2020-04-14

## 2020-04-14 RX ORDER — DOXAZOSIN MESYLATE 4 MG
1 TABLET ORAL
Qty: 0 | Refills: 0 | DISCHARGE

## 2020-04-14 RX ORDER — ASPIRIN/CALCIUM CARB/MAGNESIUM 324 MG
1 TABLET ORAL
Qty: 0 | Refills: 0 | DISCHARGE
Start: 2020-04-14

## 2020-04-14 RX ORDER — AMLODIPINE BESYLATE 2.5 MG/1
1 TABLET ORAL
Qty: 0 | Refills: 0 | DISCHARGE

## 2020-04-14 RX ORDER — ABACAVIR SULFATE, DOLUTEGRAVIR SODIUM, LAMIVUDINE 60-5-30 MG
1 KIT ORAL
Qty: 0 | Refills: 0 | DISCHARGE

## 2020-04-14 RX ORDER — CLOPIDOGREL BISULFATE 75 MG/1
1 TABLET, FILM COATED ORAL
Qty: 0 | Refills: 0 | DISCHARGE

## 2020-04-14 RX ORDER — ASCORBIC ACID 60 MG
1 TABLET,CHEWABLE ORAL
Qty: 0 | Refills: 0 | DISCHARGE

## 2020-04-14 RX ORDER — ASPIRIN/CALCIUM CARB/MAGNESIUM 324 MG
1 TABLET ORAL
Qty: 0 | Refills: 0 | DISCHARGE

## 2020-04-14 RX ORDER — BUPROPION HYDROCHLORIDE 150 MG/1
1 TABLET, EXTENDED RELEASE ORAL
Qty: 0 | Refills: 0 | DISCHARGE

## 2020-04-14 RX ORDER — ROSUVASTATIN CALCIUM 5 MG/1
1 TABLET ORAL
Qty: 0 | Refills: 0 | DISCHARGE

## 2020-04-14 RX ORDER — HYDRALAZINE HCL 50 MG
1 TABLET ORAL
Qty: 0 | Refills: 0 | DISCHARGE

## 2020-04-14 RX ADMIN — HEPARIN SODIUM 5000 UNIT(S): 5000 INJECTION INTRAVENOUS; SUBCUTANEOUS at 05:18

## 2020-04-14 NOTE — DISCHARGE NOTE PROVIDER - NSDCMRMEDTOKEN_GEN_ALL_CORE_FT
amLODIPine 10 mg oral tablet: 1 tab(s) orally once a day  aspirin 81 mg oral tablet, chewable: 1 tab(s) orally once a day  buPROPion 75 mg oral tablet: 1 tab(s) orally once a day  Crestor 10 mg oral tablet: 1 tab(s) orally once a day  doxazosin 2 mg oral tablet: 1 tab(s) orally once a day  hydrALAZINE 25 mg oral tablet: 1 tab(s) orally 3 times a day  Plavix 75 mg oral tablet: 1 tab(s) orally once a day  Triumeq oral tablet: 1 tab(s) orally once a day  Vitamin C 500 mg oral tablet: 1 tab(s) orally 2 times a day aspirin 81 mg oral tablet, chewable: 1 tab(s) orally once a day  clopidogrel 75 mg oral tablet: 1 tab(s) orally once a day

## 2020-04-14 NOTE — DISCHARGE NOTE PROVIDER - HOSPITAL COURSE
85M with PMHx of HIV, CVA, HTN, HLD, dementia, and prostate CA sent from [place] for shortness of breath and hypoxia. As per transfer papers, Sat 40% on room air.  In ED, Sat 93% with NRB at 15L. Pt non-verbal. Pt was given supplemental oxygen until they could breathe on room air. Pt was given antibiotics and Plaquenil. Pt is palliative care and stable to transfer to NH.

## 2020-04-14 NOTE — DISCHARGE NOTE NURSING/CASE MANAGEMENT/SOCIAL WORK - PATIENT PORTAL LINK FT
You can access the FollowMyHealth Patient Portal offered by [facility] by registering at the following website: [email or web address]. By joining [email or web address]’s FollowMyHealth portal, you will also be able to view your health information using other applications (apps) compatible with our system.

## 2020-04-14 NOTE — CHART NOTE - NSCHARTNOTEFT_GEN_A_CORE
Palliative Care:  85M with PMHx of HIV, CVA, HTN, HLD, dementia, and prostate CA sent from [place] for shortness of breath and hypoxia.  CXR :  No evidence of active chest disease.  Was on NRB, now on NC satting 98%.  Completed course of Rocephin and Azithromycin.  DNR/DNI/Comfort care on file.  Spoke with the pt's jammie Richmond ([phone]) reviewed all aspects of [facility] DNR/DNI/Comfort measures only.   Confirmed discharge plan back to [place] with comfort care.  SW referral made.

## 2020-04-14 NOTE — DISCHARGE NOTE PROVIDER - NSDCCAREPROVSEEN_GEN_ALL_CORE_FT
Earnest Nguyen - PATIENT IS GRADUALLY DECLINING AND PROGNOSIS IS POOR. PALLIATIVE CARE IS IN PROGRESS, D/W FAMILY - DNR / DNI / COMFORT CARE. PATIENT IS TO BE DISCHARGED TO [facility] FOR HOSPICE CARE  - HYPOXIC RESPIRATORY FAILURE DUE TO COVID 19 PNEUMONIA , AND SEPSIS  - ARF, CKD , HYPERNATREMIA   - AMS DUE TO METABOLIC ENCEPHALOPATHY, ADVANCED VASCULAR DEMENTIA   -  HIV - ON ANTIRETROVIRALS    - DR. KELSEY SWENSON  COVERING DR. JULIETTE SWENSON

## 2020-04-14 NOTE — DISCHARGE NOTE PROVIDER - NSDCCPCAREPLAN_GEN_ALL_CORE_FT
PRINCIPAL DISCHARGE DIAGNOSIS  Diagnosis: Hypoxia  Assessment and Plan of Treatment: PRINCIPAL DISCHARGE DIAGNOSIS  Diagnosis: Hypoxia  Assessment and Plan of Treatment: Acute respiratory failure with hypoxia. You were found to have respiratory failure with hypoxia likely due to the novel covid-19 virus. You were given supplemental oxygen until you could breathe normally on room air. Please seek medical attention if you experience shortness of breath and follow up with your primary care doctor in 1-2 weeks.  Coronavirus Instructions:  Based on your current clinical status and stability, it has been determined that you no longer need hospitalization and can recover while remaining in self-quarantine at home. You should follow the prevention steps below until a healthcare provider or local or state health department says you can return to your normal activities.   1. You should restrict activities outside your home, except for getting medical care.   2. Do not to work, school, or public areas.   3. Avoid using public transportation, ride-sharing, or taxis.   4. Separate yourself from other people and animals in your home as much as possible. When you are around other people (e.g., sharing a room or vehicle) you should wear a face-mask.  5. Wash your hands often with soap and water for at least 20 seconds, especially after blowing your nose, coughing, or sneezing; going to the bathroom; and before eating or preparing food.   6. Cover your mouth and nose with a tissue when you cough or sneeze. Throw used tissue in a lined trash can. Immediately wash your hands with soap and water for at least 20 seconds.  7. You need to frequently clean high touch surfaces include counters, tabletops, doorknobs, bathroom fixtures, toilets, phones, keyboards, tablets, and bedside tables.   8. Avoid sharing dishes, drinking glasses, cups, eating utensils, towels, or bedding with other.        SECONDARY DISCHARGE DIAGNOSES  Diagnosis: HIV (human immunodeficiency virus infection)  Assessment and Plan of Treatment: HIV (human immunodeficiency virus infection) You were found to have a hx of HIV on admission. Your antiviral home medications were continued. Please f/u with your primary care doctor in 1-2 weeks.

## 2022-08-20 NOTE — H&P ADULT - PROBLEM SELECTOR PLAN 7
IMPROVE VTE Individual Risk Assessment  RISK                                                                Points  [  ] Previous VTE                                                  3  [  ] Thrombophilia                                               2  [  ] Lower limb paralysis                                      2        (unable to hold up >15 seconds)    [  ] Current Cancer                                              2         (within 6 months)  [x  ] Immobilization > 24 hrs                                1  [  ] ICU/CCU stay > 24 hours                              1  [x  ] Age > 60                                                      1  IMPROVE VTE Score _________2, heparin for DVT proph
141

## 2024-02-21 NOTE — ED ADULT NURSE NOTE - NSFALLRSKINDICATORS_ED_ALL_ED
Patient is as 89 y/o M (baseline A&Ox3), DM, recurrent UTIs (recently in cipro) and urinary retention, presenting from [facility] for urinary retention found to have UTI. 
yes
